# Patient Record
Sex: MALE | Race: WHITE | URBAN - METROPOLITAN AREA
[De-identification: names, ages, dates, MRNs, and addresses within clinical notes are randomized per-mention and may not be internally consistent; named-entity substitution may affect disease eponyms.]

---

## 2024-01-23 ENCOUNTER — OFFICE VISIT (OUTPATIENT)
Dept: INTERNAL MEDICINE CLINIC | Age: 64
End: 2024-01-23

## 2024-01-23 VITALS
SYSTOLIC BLOOD PRESSURE: 148 MMHG | DIASTOLIC BLOOD PRESSURE: 84 MMHG | BODY MASS INDEX: 26.1 KG/M2 | WEIGHT: 186.4 LBS | HEART RATE: 95 BPM | TEMPERATURE: 98.4 F | HEIGHT: 71 IN | OXYGEN SATURATION: 98 %

## 2024-01-23 DIAGNOSIS — R94.31 ABNORMAL EKG: ICD-10-CM

## 2024-01-23 DIAGNOSIS — Z00.00 ANNUAL PHYSICAL EXAM: ICD-10-CM

## 2024-01-23 DIAGNOSIS — Z76.89 ENCOUNTER TO ESTABLISH CARE WITH NEW DOCTOR: Primary | ICD-10-CM

## 2024-01-23 DIAGNOSIS — R03.0 ELEVATED BP WITHOUT DIAGNOSIS OF HYPERTENSION: ICD-10-CM

## 2024-01-23 DIAGNOSIS — Z11.59 NEED FOR HEPATITIS C SCREENING TEST: ICD-10-CM

## 2024-01-23 DIAGNOSIS — Z12.5 SCREENING PSA (PROSTATE SPECIFIC ANTIGEN): ICD-10-CM

## 2024-01-23 DIAGNOSIS — Z12.11 SCREEN FOR COLON CANCER: ICD-10-CM

## 2024-01-23 DIAGNOSIS — Z11.4 SCREENING FOR HIV (HUMAN IMMUNODEFICIENCY VIRUS): ICD-10-CM

## 2024-01-23 DIAGNOSIS — Z71.85 VACCINE COUNSELING: ICD-10-CM

## 2024-01-23 DIAGNOSIS — F17.200 SMOKER: ICD-10-CM

## 2024-01-23 DIAGNOSIS — R00.0 RACING HEART BEAT: ICD-10-CM

## 2024-01-23 DIAGNOSIS — Z13.220 SCREENING, LIPID: ICD-10-CM

## 2024-01-23 RX ORDER — BLOOD PRESSURE TEST KIT
KIT MISCELLANEOUS
Qty: 1 KIT | Refills: 0 | Status: SHIPPED | OUTPATIENT
Start: 2024-01-23

## 2024-01-23 SDOH — ECONOMIC STABILITY: HOUSING INSECURITY
IN THE LAST 12 MONTHS, WAS THERE A TIME WHEN YOU DID NOT HAVE A STEADY PLACE TO SLEEP OR SLEPT IN A SHELTER (INCLUDING NOW)?: NO

## 2024-01-23 SDOH — ECONOMIC STABILITY: INCOME INSECURITY: HOW HARD IS IT FOR YOU TO PAY FOR THE VERY BASICS LIKE FOOD, HOUSING, MEDICAL CARE, AND HEATING?: NOT HARD AT ALL

## 2024-01-23 SDOH — ECONOMIC STABILITY: FOOD INSECURITY: WITHIN THE PAST 12 MONTHS, YOU WORRIED THAT YOUR FOOD WOULD RUN OUT BEFORE YOU GOT MONEY TO BUY MORE.: NEVER TRUE

## 2024-01-23 SDOH — ECONOMIC STABILITY: FOOD INSECURITY: WITHIN THE PAST 12 MONTHS, THE FOOD YOU BOUGHT JUST DIDN'T LAST AND YOU DIDN'T HAVE MONEY TO GET MORE.: NEVER TRUE

## 2024-01-23 ASSESSMENT — ENCOUNTER SYMPTOMS
COUGH: 0
DIARRHEA: 0
VOMITING: 0
SHORTNESS OF BREATH: 0
CONSTIPATION: 0
WHEEZING: 0
NAUSEA: 0
ABDOMINAL PAIN: 0

## 2024-01-23 ASSESSMENT — PATIENT HEALTH QUESTIONNAIRE - PHQ9
SUM OF ALL RESPONSES TO PHQ QUESTIONS 1-9: 0
2. FEELING DOWN, DEPRESSED OR HOPELESS: 0
SUM OF ALL RESPONSES TO PHQ9 QUESTIONS 1 & 2: 0
1. LITTLE INTEREST OR PLEASURE IN DOING THINGS: 0

## 2024-01-23 NOTE — PROGRESS NOTES
New Patient Office Visit   1/23/2024    Subjective:  Chief Complaint   Patient presents with    New Patient    Other     Chest feels odd- Only when lying down intermittently with rapid heart beat    Loss of Consciousness     Approx 1 mo ago concerns with bp      HPI:  Khai Palacios is a 64 y.o. male who presents to the clinic today to establish care. Also due for an annual physical. States \"I have never went to the doctor.\"  Pt also has acute concerns.    States he has intermittent chest \"weird feeling\" and heart beating fast 24-25 years ago. Denies chest pain. States it \"just doesn't feel right.\" States this is still occurring intermittently at night. Has not had symptoms in 1 month. Denies chest pain, palpitations, shortness of breath, trouble breathing, lightheadedness, dizziness or blurred vision. Asymptomatic at this time.  Does not monitor BP at home.    Also has episodes of \"blacking out\" after smoking marijuana. States that \"I just woke up and was fine.\" States this lasts a minute and resolves spontaneously. Has occurred \"4 times in the past 8-10 years.\" Last episode was a month ago.    Smoker- 2 ppd. Smoking for 48 years. Not ready to quit.    Last colonoscopy: never    Worked construction. Retired. 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 Lives in [place].     Review of Systems   Constitutional:  Negative for chills, fatigue and fever.   Respiratory:  Negative for cough, shortness of breath and wheezing.    Cardiovascular:  Negative for chest pain, palpitations and leg swelling.        Heart racing at night intermittently for 24-25 years   Gastrointestinal:  Negative for abdominal pain, constipation, diarrhea, nausea and vomiting.   Skin:  Negative for pallor and rash.   Neurological:  Negative for dizziness, weakness, light-headedness, numbness and headaches.   Psychiatric/Behavioral:  Negative for dysphoric mood, self-injury, sleep disturbance and suicidal ideas. The patient is not nervous/anxious.      No Known

## 2024-01-23 NOTE — PATIENT INSTRUCTIONS
Please get your fasting lab work (no food or drink for 10-12 hours prior besides water) completed M-F 730a-430p at our office. [facility] lab has walk-in hours available as well - no appointment is needed. We will call or mychart message you with your results.     Take BP and HR daily and keep a log. Bring BP/HR log to the next visit.    Complete stool sample and mail back.    Call [address] [place] [address] to schedule echo.

## 2024-01-24 DIAGNOSIS — Z00.00 ANNUAL PHYSICAL EXAM: ICD-10-CM

## 2024-01-24 DIAGNOSIS — Z11.4 SCREENING FOR HIV (HUMAN IMMUNODEFICIENCY VIRUS): ICD-10-CM

## 2024-01-24 DIAGNOSIS — Z12.5 SCREENING PSA (PROSTATE SPECIFIC ANTIGEN): ICD-10-CM

## 2024-01-24 DIAGNOSIS — Z13.220 SCREENING, LIPID: ICD-10-CM

## 2024-01-24 DIAGNOSIS — Z11.59 NEED FOR HEPATITIS C SCREENING TEST: ICD-10-CM

## 2024-01-24 DIAGNOSIS — R00.0 RACING HEART BEAT: ICD-10-CM

## 2024-01-24 LAB
ALBUMIN SERPL-MCNC: 4.3 G/DL (ref 3.4–5)
ALBUMIN/GLOB SERPL: 1.7 {RATIO} (ref 1.1–2.2)
ALP SERPL-CCNC: 85 U/L (ref 40–129)
ALT SERPL-CCNC: 14 U/L (ref 10–40)
ANION GAP SERPL CALCULATED.3IONS-SCNC: 12 MMOL/L (ref 3–16)
AST SERPL-CCNC: 14 U/L (ref 15–37)
BASOPHILS # BLD: 0.1 K/UL (ref 0–0.2)
BASOPHILS NFR BLD: 0.9 %
BILIRUB SERPL-MCNC: 0.5 MG/DL (ref 0–1)
BUN SERPL-MCNC: 9 MG/DL (ref 7–20)
CALCIUM SERPL-MCNC: 9 MG/DL (ref 8.3–10.6)
CHLORIDE SERPL-SCNC: 103 MMOL/L (ref 99–110)
CHOLEST SERPL-MCNC: 215 MG/DL (ref 0–199)
CO2 SERPL-SCNC: 26 MMOL/L (ref 21–32)
CREAT SERPL-MCNC: 0.9 MG/DL (ref 0.8–1.3)
DEPRECATED RDW RBC AUTO: 13.3 % (ref 12.4–15.4)
EOSINOPHIL # BLD: 0.4 K/UL (ref 0–0.6)
EOSINOPHIL NFR BLD: 3.7 %
GFR SERPLBLD CREATININE-BSD FMLA CKD-EPI: >60 ML/MIN/{1.73_M2}
GLUCOSE SERPL-MCNC: 83 MG/DL (ref 70–99)
HCT VFR BLD AUTO: 43.7 % (ref 40.5–52.5)
HCV AB SERPL QL IA: NORMAL
HDLC SERPL-MCNC: 44 MG/DL (ref 40–60)
HGB BLD-MCNC: 15.1 G/DL (ref 13.5–17.5)
LDL CHOLESTEROL CALCULATED: 151 MG/DL
LYMPHOCYTES # BLD: 3.6 K/UL (ref 1–5.1)
LYMPHOCYTES NFR BLD: 30.7 %
MCH RBC QN AUTO: 30.8 PG (ref 26–34)
MCHC RBC AUTO-ENTMCNC: 34.5 G/DL (ref 31–36)
MCV RBC AUTO: 89.2 FL (ref 80–100)
MONOCYTES # BLD: 0.6 K/UL (ref 0–1.3)
MONOCYTES NFR BLD: 4.7 %
NEUTROPHILS # BLD: 7.1 K/UL (ref 1.7–7.7)
NEUTROPHILS NFR BLD: 60 %
PLATELET # BLD AUTO: 248 K/UL (ref 135–450)
PMV BLD AUTO: 7.7 FL (ref 5–10.5)
POTASSIUM SERPL-SCNC: 4.3 MMOL/L (ref 3.5–5.1)
PROT SERPL-MCNC: 6.9 G/DL (ref 6.4–8.2)
PSA SERPL DL<=0.01 NG/ML-MCNC: 1.48 NG/ML (ref 0–4)
RBC # BLD AUTO: 4.9 M/UL (ref 4.2–5.9)
SODIUM SERPL-SCNC: 141 MMOL/L (ref 136–145)
TRIGL SERPL-MCNC: 102 MG/DL (ref 0–150)
TSH SERPL DL<=0.005 MIU/L-ACNC: 2.87 UIU/ML (ref 0.27–4.2)
VLDLC SERPL CALC-MCNC: 20 MG/DL
WBC # BLD AUTO: 11.9 K/UL (ref 4–11)

## 2024-01-25 LAB
HIV 1+2 AB+HIV1 P24 AG SERPL QL IA: NORMAL
HIV 2 AB SERPL QL IA: NORMAL
HIV1 AB SERPL QL IA: NORMAL
HIV1 P24 AG SERPL QL IA: NORMAL

## 2024-01-26 ENCOUNTER — TELEPHONE (OUTPATIENT)
Dept: INTERNAL MEDICINE CLINIC | Age: 64
End: 2024-01-26

## 2024-01-26 DIAGNOSIS — Z79.899 MEDICATION MANAGEMENT: ICD-10-CM

## 2024-01-26 DIAGNOSIS — Z91.89 CANDIDATE FOR STATIN THERAPY DUE TO RISK OF FUTURE CARDIOVASCULAR EVENT: Primary | ICD-10-CM

## 2024-01-26 DIAGNOSIS — D72.828 OTHER ELEVATED WHITE BLOOD CELL COUNT: Primary | ICD-10-CM

## 2024-01-26 RX ORDER — ATORVASTATIN CALCIUM 40 MG/1
40 TABLET, FILM COATED ORAL DAILY
Qty: 90 TABLET | Refills: 0 | Status: SHIPPED | OUTPATIENT
Start: 2024-01-26

## 2024-01-26 NOTE — PROGRESS NOTES
The 10-year ASCVD risk score (Marisol SUGGS, et al., 2019) is: 22.7%    Values used to calculate the score:      Age: 64 years      Sex: Male      Is Non- : No      Diabetic: No      Tobacco smoker: Yes      Systolic Blood Pressure: 148 mmHg      Is BP treated: No      HDL Cholesterol: 44 mg/dL      Total Cholesterol: 215 mg/dL

## 2024-01-26 NOTE — TELEPHONE ENCOUNTER
----- Message from Margaret Rivera sent at 1/26/2024 10:53 AM EST -----  Subject: Message to Provider    QUESTIONS  Information for Provider? pt. is returning office phone call office did   not answer phone when pt. called back please , call pt. back   ---------------------------------------------------------------------------  --------------  CALL BACK INFO  [phone]; OK to leave message on voicemail  ---------------------------------------------------------------------------  --------------  SCRIPT ANSWERS  undefined

## 2024-02-13 ENCOUNTER — OFFICE VISIT (OUTPATIENT)
Dept: INTERNAL MEDICINE CLINIC | Age: 64
End: 2024-02-13
Payer: COMMERCIAL

## 2024-02-13 VITALS
DIASTOLIC BLOOD PRESSURE: 80 MMHG | HEART RATE: 80 BPM | HEIGHT: 71 IN | BODY MASS INDEX: 25.93 KG/M2 | OXYGEN SATURATION: 95 % | WEIGHT: 185.2 LBS | SYSTOLIC BLOOD PRESSURE: 142 MMHG

## 2024-02-13 DIAGNOSIS — E78.2 MIXED HYPERLIPIDEMIA: ICD-10-CM

## 2024-02-13 DIAGNOSIS — R03.0 ELEVATED BP WITHOUT DIAGNOSIS OF HYPERTENSION: Primary | ICD-10-CM

## 2024-02-13 DIAGNOSIS — D72.828 OTHER ELEVATED WHITE BLOOD CELL (WBC) COUNT: ICD-10-CM

## 2024-02-13 DIAGNOSIS — Z87.891 PERSONAL HISTORY OF TOBACCO USE: ICD-10-CM

## 2024-02-13 DIAGNOSIS — I10 PRIMARY HYPERTENSION: ICD-10-CM

## 2024-02-13 DIAGNOSIS — R00.0 RACING HEART BEAT: ICD-10-CM

## 2024-02-13 DIAGNOSIS — F17.200 SMOKER: ICD-10-CM

## 2024-02-13 DIAGNOSIS — Z12.11 SCREEN FOR COLON CANCER: ICD-10-CM

## 2024-02-13 PROCEDURE — G0296 VISIT TO DETERM LDCT ELIG: HCPCS | Performed by: NURSE PRACTITIONER

## 2024-02-13 PROCEDURE — 99215 OFFICE O/P EST HI 40 MIN: CPT | Performed by: NURSE PRACTITIONER

## 2024-02-13 PROCEDURE — 3079F DIAST BP 80-89 MM HG: CPT | Performed by: NURSE PRACTITIONER

## 2024-02-13 PROCEDURE — 3077F SYST BP >= 140 MM HG: CPT | Performed by: NURSE PRACTITIONER

## 2024-02-13 RX ORDER — LOSARTAN POTASSIUM 50 MG/1
50 TABLET ORAL DAILY
Qty: 30 TABLET | Refills: 1 | Status: SHIPPED | OUTPATIENT
Start: 2024-02-13

## 2024-02-13 NOTE — PATIENT INSTRUCTIONS
Call [address] [place] [address] to schedule holter monitor and lung cancer screening CT scan    Repeat fasting labs 6 weeks from when you started lipitor.  Please get your fasting lab work (no food or drink for 10-12 hours prior besides water) completed M-F 730a-430p at our office. [facility] lab has walk-in hours available as well - no appointment is needed. We will call or mychart message you with your results.     Start losartan daily.       Learning About Lung Cancer Screening  What is screening for lung cancer?     Lung cancer screening is a way to find some lung cancers early, before a person has any symptoms of the cancer.  Lung cancer screening may help those who have the highest risk for lung cancer--people age 50 and older who are or were heavy smokers. For most people, who aren't at increased risk, screening for lung cancer probably isn't helpful.  Screening won't prevent cancer. And it may not find all lung cancers. Lung cancer screening may lower the risk of dying from lung cancer in a small number of people.  How is it done?  Lung cancer screening is done with a low-dose CT (computed tomography) scan. A CT scan uses X-rays, or radiation, to make detailed pictures of your body. Experts recommend that screening be done in medical centers that focus on finding and treating lung cancer.  Who is screening recommended for?  Lung cancer screening is recommended for people age 50 and older who are or were heavy smokers. That means people with a smoking history of at least 20 pack years. A pack year is a way to measure how heavy a smoker you are or were.  To figure out your pack years, multiply how many packs a day on average (assuming 20 cigarettes per pack) you have smoked by how many years you have smoked. For example:  If you smoked 1 pack a day for 20 years, that's 1 times 20. So you have a smoking history of 20 pack years.  If you smoked 2 packs a day for 10 years, that's 2 times 10. So you have a smoking

## 2024-02-13 NOTE — PROGRESS NOTES
Office Visit  2/13/2024    Subjective:  Chief Complaint   Patient presents with    Follow-up    Hypertension     HPI:   Khai Palacios is a 64 y.o. male who presents to the clinic today for follow up.    Elevated blood pressure without the diagnosis hypertension -  Monitoring home BP log - thinks his home BP cuff is wrong. Home BP ranges from 111//150 on log. Did not bring BP cuff to compare today.  Racing heartbeat when he lies down for the last 20+ years. States that this has not occurred since our last visit.  has echo scheduled.    Hyperlipidemia- taking statin as prescribed- denies side effects.  Has repeat labs for 6 weeks after starting the medication.    Review of Systems   Constitutional:  Negative for chills, fatigue and fever.   Respiratory:  Negative for cough, shortness of breath and wheezing.    Cardiovascular:  Negative for chest pain, palpitations and leg swelling.   Gastrointestinal:  Negative for abdominal pain, constipation, diarrhea, nausea and vomiting.   Skin:  Negative for pallor and rash.   Neurological:  Negative for dizziness, weakness, light-headedness, numbness and headaches.   Psychiatric/Behavioral:  Negative for dysphoric mood, self-injury, sleep disturbance and suicidal ideas. The patient is not nervous/anxious.      No Known Allergies    Current Outpatient Rx   Medication Sig Dispense Refill    losartan (COZAAR) 50 MG tablet Take 1 tablet by mouth daily 30 tablet 1    atorvastatin (LIPITOR) 40 MG tablet Take 1 tablet by mouth daily 90 tablet 0    Blood Pressure KIT Take BP daily and keep a log. 1 kit 0    ibuprofen (ADVIL;MOTRIN) 200 MG tablet Take 4 tablets by mouth every 6 hours as needed       Patient Active Problem List   Diagnosis    Mixed hyperlipidemia     Wt Readings from Last 3 Encounters:   02/13/24 84 kg (185 lb 3.2 oz)   01/23/24 84.6 kg (186 lb 6.4 oz)   09/24/12 77.1 kg (170 lb)     BP Readings from Last 3 Encounters:   02/13/24 (!) 142/80   01/23/24 (!) 148/84

## 2024-02-17 LAB — NONINV COLON CA DNA+OCC BLD SCRN STL QL: POSITIVE

## 2024-02-20 ENCOUNTER — HOSPITAL ENCOUNTER (OUTPATIENT)
Dept: NEUROLOGY | Age: 64
Discharge: HOME | End: 2024-02-20
Payer: COMMERCIAL

## 2024-02-20 DIAGNOSIS — R00.0 RACING HEART BEAT: ICD-10-CM

## 2024-02-20 DIAGNOSIS — R19.5 POSITIVE COLORECTAL CANCER SCREENING USING COLOGUARD TEST: Primary | ICD-10-CM

## 2024-02-20 PROCEDURE — 93226 XTRNL ECG REC<48 HR SCAN A/R: CPT

## 2024-02-20 PROCEDURE — 93225 XTRNL ECG REC<48 HRS REC: CPT

## 2024-02-27 ENCOUNTER — HOSPITAL ENCOUNTER (OUTPATIENT)
Dept: NON INVASIVE DIAGNOSTICS | Age: 64
Discharge: HOME | End: 2024-02-27
Payer: COMMERCIAL

## 2024-02-27 LAB
ACQUISITION DURATION: NORMAL S
AVERAGE HEART RATE: 94 BPM
EKG DIAGNOSIS: NORMAL
HOLTER MAX HEART RATE: 124 BPM
HOOKUP DATE: NORMAL
HOOKUP TIME: NORMAL
MAX HEART RATE TIME/DATE: NORMAL
MIN HEART RATE TIME/DATE: NORMAL
MIN HEART RATE: 72 BPM
NUMBER OF QRS COMPLEXES: NORMAL
NUMBER OF SUPRAVENTRICULAR COUPLETS: 0
NUMBER OF SUPRAVENTRICULAR ECTOPICS: 214
NUMBER OF SUPRAVENTRICULAR ISOLATED BEATS: 212
NUMBER OF VENTRICULAR BIGEMINAL CYCLES: 0
NUMBER OF VENTRICULAR COUPLETS: 0
NUMBER OF VENTRICULAR ECTOPICS: 150

## 2024-02-27 PROCEDURE — 93306 TTE W/DOPPLER COMPLETE: CPT

## 2024-02-28 ENCOUNTER — TELEPHONE (OUTPATIENT)
Dept: INTERNAL MEDICINE CLINIC | Age: 64
End: 2024-02-28

## 2024-02-29 ENCOUNTER — TELEPHONE (OUTPATIENT)
Dept: INTERNAL MEDICINE CLINIC | Age: 64
End: 2024-02-29

## 2024-02-29 NOTE — TELEPHONE ENCOUNTER
Pt states he missed a call for results yesterday, he is returning the call. Please advise and call pt. Aware Raquel is out of the office today.

## 2024-03-01 ENCOUNTER — TELEPHONE (OUTPATIENT)
Dept: INTERNAL MEDICINE CLINIC | Age: 64
End: 2024-03-01

## 2024-03-01 DIAGNOSIS — I10 PRIMARY HYPERTENSION: ICD-10-CM

## 2024-03-01 DIAGNOSIS — R00.0 RACING HEART BEAT: Primary | ICD-10-CM

## 2024-03-04 ENCOUNTER — HOSPITAL ENCOUNTER (OUTPATIENT)
Dept: CT IMAGING | Age: 64
Discharge: HOME | End: 2024-03-04
Payer: COMMERCIAL

## 2024-03-04 DIAGNOSIS — Z87.891 PERSONAL HISTORY OF TOBACCO USE: ICD-10-CM

## 2024-03-04 PROCEDURE — 71271 CT THORAX LUNG CANCER SCR C-: CPT

## 2024-03-05 ENCOUNTER — TELEPHONE (OUTPATIENT)
Dept: CARDIOLOGY CLINIC | Age: 64
End: 2024-03-05

## 2024-03-05 NOTE — TELEPHONE ENCOUNTER
Pt's partner is asking for a call to discuss echo & holter monitor results and asking if pt should make appt with our office. Wife states pcp suggested to contact us do to results. Pt had colonoscopy scheduled and was canceled. Please call pt to advise.

## 2024-03-05 NOTE — PROGRESS NOTES
[facility]  Cardiology Note  [phone]      Chief Complaint   Patient presents with    Hyperlipidemia     No cardiac symptoms at this time.    Palpitations    New Patient      History of Present Illness:  Khai Palacios is a 64 y.o. patient PMHx HTN, HLD, palpitations who presented to the office at the request of his PCP JANIS Oneill for cardiac evaluation of chest pain and palpitations.  I have been asked to provide consultation regarding further management and testing. He is retired, used to race motorcycles. He smokes cigarettes >1ppd.    Recent ECHO stable with normal EF. Recent HM normal with episodes of ST.    Today, he reports he has had episodes of weird feeling in his chest, not a true pain, his pulse felt like it was \"skipping\" beats which concerned him. He woke up suddenly once with hands tingling which scared him.  Sometimes he has chest pain when walking his dog, but not every time. He rode his bike the other day with no symptoms. He also reports times of heart racing slowly comes on, dissipates in about 15 minutes; these are not new has had intermittently for many years.  He states symptoms slowly improving since starting B/P med 2/13/24. He quit smoking in the past but then gained weight. He is asking if he is ok to proceed with a colonoscopy.    Past Medical History:   has a past medical history of Neck pain.    Surgical History:   has no past surgical history on file.     Social History:   reports that he has been smoking cigarettes. He started smoking about 48 years ago. He has a 96.2 pack-year smoking history. He does not have any smokeless tobacco history on file. He reports current alcohol use. He reports current drug use. Drug: Marijuana (Weed).     Family History:  Family History   Problem Relation Age of Onset    Dementia Mother     Heart Disease Father     Heart Attack Father     Stroke Neg Hx     Prostate Cancer Neg Hx        Home Medications:  Were reviewed and are listed

## 2024-03-06 ENCOUNTER — OFFICE VISIT (OUTPATIENT)
Dept: CARDIOLOGY CLINIC | Age: 64
End: 2024-03-06
Payer: COMMERCIAL

## 2024-03-06 ENCOUNTER — TELEPHONE (OUTPATIENT)
Dept: INTERNAL MEDICINE CLINIC | Age: 64
End: 2024-03-06

## 2024-03-06 VITALS
HEIGHT: 71 IN | SYSTOLIC BLOOD PRESSURE: 150 MMHG | DIASTOLIC BLOOD PRESSURE: 82 MMHG | OXYGEN SATURATION: 98 % | WEIGHT: 184 LBS | HEART RATE: 91 BPM | BODY MASS INDEX: 25.76 KG/M2

## 2024-03-06 DIAGNOSIS — E78.2 MIXED HYPERLIPIDEMIA: ICD-10-CM

## 2024-03-06 DIAGNOSIS — R07.9 CHEST PAIN, UNSPECIFIED TYPE: Primary | ICD-10-CM

## 2024-03-06 DIAGNOSIS — R00.2 PALPITATIONS: ICD-10-CM

## 2024-03-06 DIAGNOSIS — R91.8 ABNORMAL CT LUNG SCREENING: Primary | ICD-10-CM

## 2024-03-06 PROCEDURE — 99204 OFFICE O/P NEW MOD 45 MIN: CPT | Performed by: INTERNAL MEDICINE

## 2024-03-06 RX ORDER — METOPROLOL TARTRATE 50 MG/1
TABLET, FILM COATED ORAL
Qty: 3 TABLET | Refills: 0 | Status: SHIPPED | OUTPATIENT
Start: 2024-03-06

## 2024-03-06 NOTE — PATIENT INSTRUCTIONS
Strongly encouraged to quit smoking -- find a replacement activity.    Recommend regular exercise.    Take metoprolol 150mg (3 tablets) two hours before your cat scan.

## 2024-03-06 NOTE — TELEPHONE ENCOUNTER
I called and reviewed the results of the CT lung screening with the patient.  Patient reports that he is completely asymptomatic.  He denies cough, shortness of breath, fever or chills.  Patient agreeable to repeat CT lung screening in 3 months.  Order was placed.  He will call to schedule.  Patient agreeable to call if he develops any symptoms    Electronically signed by: JANIS Disla CNP 03/06/24

## 2024-03-12 ENCOUNTER — OFFICE VISIT (OUTPATIENT)
Dept: INTERNAL MEDICINE CLINIC | Age: 64
End: 2024-03-12
Payer: COMMERCIAL

## 2024-03-12 VITALS
BODY MASS INDEX: 26.12 KG/M2 | DIASTOLIC BLOOD PRESSURE: 70 MMHG | OXYGEN SATURATION: 98 % | SYSTOLIC BLOOD PRESSURE: 130 MMHG | HEART RATE: 80 BPM | WEIGHT: 186.6 LBS | HEIGHT: 71 IN

## 2024-03-12 DIAGNOSIS — R19.5 POSITIVE COLORECTAL CANCER SCREENING USING COLOGUARD TEST: ICD-10-CM

## 2024-03-12 DIAGNOSIS — D72.828 OTHER ELEVATED WHITE BLOOD CELL (WBC) COUNT: ICD-10-CM

## 2024-03-12 DIAGNOSIS — E78.2 MIXED HYPERLIPIDEMIA: ICD-10-CM

## 2024-03-12 DIAGNOSIS — R00.0 RACING HEART BEAT: ICD-10-CM

## 2024-03-12 DIAGNOSIS — I10 PRIMARY HYPERTENSION: Primary | ICD-10-CM

## 2024-03-12 DIAGNOSIS — F17.200 SMOKER: ICD-10-CM

## 2024-03-12 DIAGNOSIS — Z91.89 CANDIDATE FOR STATIN THERAPY DUE TO RISK OF FUTURE CARDIOVASCULAR EVENT: ICD-10-CM

## 2024-03-12 PROCEDURE — 3075F SYST BP GE 130 - 139MM HG: CPT | Performed by: NURSE PRACTITIONER

## 2024-03-12 PROCEDURE — 3078F DIAST BP <80 MM HG: CPT | Performed by: NURSE PRACTITIONER

## 2024-03-12 PROCEDURE — 99214 OFFICE O/P EST MOD 30 MIN: CPT | Performed by: NURSE PRACTITIONER

## 2024-03-12 RX ORDER — ATORVASTATIN CALCIUM 40 MG/1
40 TABLET, FILM COATED ORAL DAILY
Qty: 90 TABLET | Refills: 0 | Status: SHIPPED | OUTPATIENT
Start: 2024-03-12

## 2024-03-12 RX ORDER — LOSARTAN POTASSIUM 50 MG/1
50 TABLET ORAL DAILY
Qty: 90 TABLET | Refills: 0 | Status: SHIPPED | OUTPATIENT
Start: 2024-03-12

## 2024-03-12 ASSESSMENT — ENCOUNTER SYMPTOMS
NAUSEA: 0
VOMITING: 0
SHORTNESS OF BREATH: 0
WHEEZING: 0
DIARRHEA: 0
ABDOMINAL PAIN: 0
COUGH: 0
CONSTIPATION: 0

## 2024-03-12 NOTE — PROGRESS NOTES
Office Visit   3/12/2024    Subjective:  Chief Complaint   Patient presents with    Follow-up    Hypertension     HPI:   Khai Palacios is a 64 y.o. male who presents to the clinic today for follow up.    hypertension - Monitoring home BP log -patient is taking losartan 50 mg by mouth daily without side effects. home BP ranges from 123-169/ on log- reviewed home BP log. However, in the last week, his BP has been running to goal. States he feels better on this medications. Palpitations have resolved on this medication per pt.   Seeing cardiology.  Has cardiac scan scheduled.     Hyperlipidemia- taking liptor 40 mg daily as prescribed- denies side effects.  Has repeat labs ordered and plans to completed these this week.    Smoker- was smoking 2 ppd. Smoking for 48 years. Quit smoking cold turkey on Saturday.    Review of Systems   Constitutional:  Negative for chills, fatigue and fever.   Respiratory:  Negative for cough, shortness of breath and wheezing.    Cardiovascular:  Negative for chest pain.   Gastrointestinal:  Negative for abdominal pain, constipation, diarrhea, nausea and vomiting.   Skin:  Negative for pallor and rash.   Neurological:  Negative for dizziness, weakness, light-headedness, numbness and headaches.   Psychiatric/Behavioral:  Negative for dysphoric mood, self-injury, sleep disturbance and suicidal ideas. The patient is not nervous/anxious.      No Known Allergies    Current Outpatient Rx   Medication Sig Dispense Refill    losartan (COZAAR) 50 MG tablet Take 1 tablet by mouth daily 90 tablet 0    atorvastatin (LIPITOR) 40 MG tablet Take 1 tablet by mouth daily 90 tablet 0    Blood Pressure KIT Take BP daily and keep a log. 1 kit 0    ibuprofen (ADVIL;MOTRIN) 200 MG tablet Take 4 tablets by mouth every 6 hours as needed      metoprolol tartrate (LOPRESSOR) 50 MG tablet Take 150mg (3 tablets) two hours before cat scan. (Patient not taking: Reported on 3/12/2024) 3 tablet 0     Patient

## 2024-03-12 NOTE — PATIENT INSTRUCTIONS
Please get your fasting lab work (no food or drink for 10-12 hours prior besides water) completed M-F 730a-430p at our office. [facility] lab has walk-in hours available as well - no appointment is needed. We will call or [employer] message you with your results.

## 2024-03-20 ENCOUNTER — PRE-PROCEDURE TELEPHONE (OUTPATIENT)
Dept: INTERVENTIONAL RADIOLOGY/VASCULAR | Age: 64
End: 2024-03-20

## 2024-03-27 ENCOUNTER — TELEPHONE (OUTPATIENT)
Age: 64
End: 2024-03-27

## 2024-03-27 ENCOUNTER — HOSPITAL ENCOUNTER (OUTPATIENT)
Dept: CT IMAGING | Age: 64
Discharge: HOME | End: 2024-03-27
Attending: INTERNAL MEDICINE
Payer: COMMERCIAL

## 2024-03-27 VITALS
WEIGHT: 175 LBS | HEART RATE: 59 BPM | SYSTOLIC BLOOD PRESSURE: 107 MMHG | OXYGEN SATURATION: 97 % | BODY MASS INDEX: 24.5 KG/M2 | HEIGHT: 71 IN | DIASTOLIC BLOOD PRESSURE: 67 MMHG | RESPIRATION RATE: 16 BRPM

## 2024-03-27 DIAGNOSIS — R93.1 ABNORMAL COMPUTED TOMOGRAPHY ANGIOGRAPHY OF HEART: ICD-10-CM

## 2024-03-27 DIAGNOSIS — E78.2 MIXED HYPERLIPIDEMIA: ICD-10-CM

## 2024-03-27 DIAGNOSIS — R07.9 CHEST PAIN, UNSPECIFIED TYPE: ICD-10-CM

## 2024-03-27 DIAGNOSIS — R00.2 PALPITATIONS: ICD-10-CM

## 2024-03-27 LAB
CREAT SERPL-MCNC: 1.1 MG/DL (ref 0.8–1.3)
GFR SERPLBLD CREATININE-BSD FMLA CKD-EPI: 75 ML/MIN/{1.73_M2}

## 2024-03-27 PROCEDURE — 82565 ASSAY OF CREATININE: CPT

## 2024-03-27 PROCEDURE — 6360000004 HC RX CONTRAST MEDICATION: Performed by: INTERNAL MEDICINE

## 2024-03-27 PROCEDURE — 75574 CT ANGIO HRT W/3D IMAGE: CPT

## 2024-03-27 PROCEDURE — 36415 COLL VENOUS BLD VENIPUNCTURE: CPT

## 2024-03-27 RX ORDER — NITROGLYCERIN 0.4 MG/1
0.4 TABLET SUBLINGUAL ONCE
Status: DISCONTINUED | OUTPATIENT
Start: 2024-03-27 | End: 2024-03-28 | Stop reason: HOSPADM

## 2024-03-27 RX ADMIN — IOPAMIDOL 85 ML: 755 INJECTION, SOLUTION INTRAVENOUS at 09:27

## 2024-03-27 NOTE — FLOWSHEET NOTE
Pt tolerated procedure well. VSS. IV removed without difficulty. Pt given d/c instructions and stated understanding. Released in stable condition to home. /75

## 2024-03-27 NOTE — TELEPHONE ENCOUNTER
Re-attempt to discuss Cardiac CTA results. Straight to voicemail. Message left previously awaiting call back.

## 2024-03-27 NOTE — TELEPHONE ENCOUNTER
----- Message from Aaron Woodward MD sent at 3/27/2024 12:57 PM EDT -----  Please schedule patient for cath for possible blockages seen on cardiac CT

## 2024-03-28 ENCOUNTER — TELEPHONE (OUTPATIENT)
Dept: CARDIOLOGY CLINIC | Age: 64
End: 2024-03-28

## 2024-03-28 NOTE — TELEPHONE ENCOUNTER
Attempted to reach patient in regards to abnormal CTA and need for LHC. Phone goes straight to . LM to return our call.

## 2024-03-28 NOTE — TELEPHONE ENCOUNTER
Spoke to pt and advised per ADM message. Message sent to SW to call pt and schedule. Pt has no further questions at this time.

## 2024-03-28 NOTE — TELEPHONE ENCOUNTER
----- Message from Savita Baum RN sent at 3/27/2024  4:28 PM EDT -----  Regarding: Cardiac CTA results  Attempted patient twice today to discuss cardiac CTA results. Per ADM \"Please schedule patient for cath for possible blockages seen on cardiac CT\". I placed the order for the [facility] with first available, if you have time to re-attempt patient tomorrow I'd appreciate it.

## 2024-03-28 NOTE — TELEPHONE ENCOUNTER
----- Message from Savita Baum RN sent at 3/27/2024  4:28 PM EDT -----  Regarding: Cardiac CTA results  Attempted patient twice today to discuss cardiac CTA results. Per ADM \"Please schedule patient for cath for possible blockages seen on cardiac CT\". I placed the order for the [facility] with first available, if you have time to re-attempt patient tomorrow I'd appreciate it.

## 2024-03-29 ENCOUNTER — TELEPHONE (OUTPATIENT)
Dept: CARDIOLOGY CLINIC | Age: 64
End: 2024-03-29

## 2024-03-29 NOTE — TELEPHONE ENCOUNTER
LHC noted. Thank you Cary!    OV rescheduled to 4/12 s/p LHC. Encouraged to call with further questions. Pt verbalized understanding.

## 2024-03-29 NOTE — TELEPHONE ENCOUNTER
Date of Procedure: Thursday 4/11/24 @ [facility] with Dr. Avila     Time of arrival: 6:45 am     Procedure time: 8:00 am     Called and spoke to Khai and he is agreeable to date and time. Reviewed instructions and he verbalized understanding. Encouraged to call with any questions or concerns.     Published on [employer], scheduled in Epic and e-mailed to cath lab.

## 2024-03-29 NOTE — TELEPHONE ENCOUNTER
----- Message from Rona Garcia RN sent at 3/28/2024 11:15 AM EDT -----  Regarding: [facility]      Jaime Townsend,     I wasn't sure if Savita had sent you a message yesterday or not, but I followed up on a call for her to let the pt know that he needs a [facility]. She placed the order. I spoke to the pt and advised. Just let Savita know if you need anything.     RENZO Lay

## 2024-04-11 ENCOUNTER — HOSPITAL ENCOUNTER (OUTPATIENT)
Dept: CARDIAC CATH/INVASIVE PROCEDURES | Age: 64
Discharge: HOME | End: 2024-04-11
Attending: INTERNAL MEDICINE | Admitting: INTERNAL MEDICINE
Payer: COMMERCIAL

## 2024-04-11 VITALS
SYSTOLIC BLOOD PRESSURE: 141 MMHG | WEIGHT: 175 LBS | HEIGHT: 71 IN | DIASTOLIC BLOOD PRESSURE: 82 MMHG | BODY MASS INDEX: 24.5 KG/M2 | RESPIRATION RATE: 16 BRPM | OXYGEN SATURATION: 100 % | HEART RATE: 69 BPM | TEMPERATURE: 98.4 F

## 2024-04-11 DIAGNOSIS — I25.83 CORONARY ARTERY DISEASE DUE TO LIPID RICH PLAQUE: Primary | ICD-10-CM

## 2024-04-11 DIAGNOSIS — E78.2 MIXED HYPERLIPIDEMIA: ICD-10-CM

## 2024-04-11 DIAGNOSIS — I25.10 CORONARY ARTERY DISEASE DUE TO LIPID RICH PLAQUE: Primary | ICD-10-CM

## 2024-04-11 LAB
ANION GAP SERPL CALCULATED.3IONS-SCNC: 11 MMOL/L (ref 3–16)
BUN SERPL-MCNC: 11 MG/DL (ref 7–20)
CALCIUM SERPL-MCNC: 9.2 MG/DL (ref 8.3–10.6)
CHLORIDE SERPL-SCNC: 106 MMOL/L (ref 99–110)
CO2 SERPL-SCNC: 24 MMOL/L (ref 21–32)
CREAT SERPL-MCNC: 0.9 MG/DL (ref 0.8–1.3)
DEPRECATED RDW RBC AUTO: 13.6 % (ref 12.4–15.4)
EKG ATRIAL RATE: 53 BPM
EKG ATRIAL RATE: 81 BPM
EKG DIAGNOSIS: NORMAL
EKG DIAGNOSIS: NORMAL
EKG P AXIS: 72 DEGREES
EKG P AXIS: 72 DEGREES
EKG P-R INTERVAL: 158 MS
EKG P-R INTERVAL: 162 MS
EKG Q-T INTERVAL: 364 MS
EKG Q-T INTERVAL: 406 MS
EKG QRS DURATION: 88 MS
EKG QRS DURATION: 88 MS
EKG QTC CALCULATION (BAZETT): 380 MS
EKG QTC CALCULATION (BAZETT): 422 MS
EKG R AXIS: 53 DEGREES
EKG R AXIS: 66 DEGREES
EKG T AXIS: 52 DEGREES
EKG T AXIS: 54 DEGREES
EKG VENTRICULAR RATE: 53 BPM
EKG VENTRICULAR RATE: 81 BPM
GFR SERPLBLD CREATININE-BSD FMLA CKD-EPI: >90 ML/MIN/{1.73_M2}
GLUCOSE SERPL-MCNC: 104 MG/DL (ref 70–99)
HCT VFR BLD AUTO: 42.6 % (ref 40.5–52.5)
HGB BLD-MCNC: 14.3 G/DL (ref 13.5–17.5)
LEFT VENTRICULAR EJECTION FRACTION MODE: NORMAL
LV EF: 60 %
MCH RBC QN AUTO: 30.1 PG (ref 26–34)
MCHC RBC AUTO-ENTMCNC: 33.6 G/DL (ref 31–36)
MCV RBC AUTO: 89.7 FL (ref 80–100)
PLATELET # BLD AUTO: 232 K/UL (ref 135–450)
PMV BLD AUTO: 7.8 FL (ref 5–10.5)
POC ACT LR: 200 SEC
POC ACT LR: 231 SEC
POC ACT LR: 249 SEC
POTASSIUM SERPL-SCNC: 4 MMOL/L (ref 3.5–5.1)
RBC # BLD AUTO: 4.75 M/UL (ref 4.2–5.9)
SODIUM SERPL-SCNC: 141 MMOL/L (ref 136–145)
WBC # BLD AUTO: 10.1 K/UL (ref 4–11)

## 2024-04-11 PROCEDURE — C1887 CATHETER, GUIDING: HCPCS

## 2024-04-11 PROCEDURE — 92928 PRQ TCAT PLMT NTRAC ST 1 LES: CPT

## 2024-04-11 PROCEDURE — 93458 L HRT ARTERY/VENTRICLE ANGIO: CPT

## 2024-04-11 PROCEDURE — 93458 L HRT ARTERY/VENTRICLE ANGIO: CPT | Performed by: INTERNAL MEDICINE

## 2024-04-11 PROCEDURE — 85027 COMPLETE CBC AUTOMATED: CPT

## 2024-04-11 PROCEDURE — 99152 MOD SED SAME PHYS/QHP 5/>YRS: CPT

## 2024-04-11 PROCEDURE — 92928 PRQ TCAT PLMT NTRAC ST 1 LES: CPT | Performed by: INTERNAL MEDICINE

## 2024-04-11 PROCEDURE — C1769 GUIDE WIRE: HCPCS

## 2024-04-11 PROCEDURE — 93005 ELECTROCARDIOGRAM TRACING: CPT | Performed by: INTERNAL MEDICINE

## 2024-04-11 PROCEDURE — 93010 ELECTROCARDIOGRAM REPORT: CPT | Performed by: INTERNAL MEDICINE

## 2024-04-11 PROCEDURE — 93571 IV DOP VEL&/PRESS C FLO 1ST: CPT

## 2024-04-11 PROCEDURE — 80048 BASIC METABOLIC PNL TOTAL CA: CPT

## 2024-04-11 PROCEDURE — 2500000003 HC RX 250 WO HCPCS

## 2024-04-11 PROCEDURE — C1874 STENT, COATED/COV W/DEL SYS: HCPCS

## 2024-04-11 PROCEDURE — 6370000000 HC RX 637 (ALT 250 FOR IP): Performed by: INTERNAL MEDICINE

## 2024-04-11 PROCEDURE — 6360000004 HC RX CONTRAST MEDICATION: Performed by: INTERNAL MEDICINE

## 2024-04-11 PROCEDURE — 2709999900 HC NON-CHARGEABLE SUPPLY

## 2024-04-11 PROCEDURE — 2580000003 HC RX 258

## 2024-04-11 PROCEDURE — 99152 MOD SED SAME PHYS/QHP 5/>YRS: CPT | Performed by: INTERNAL MEDICINE

## 2024-04-11 PROCEDURE — 6370000000 HC RX 637 (ALT 250 FOR IP)

## 2024-04-11 PROCEDURE — C1894 INTRO/SHEATH, NON-LASER: HCPCS

## 2024-04-11 PROCEDURE — 36415 COLL VENOUS BLD VENIPUNCTURE: CPT

## 2024-04-11 PROCEDURE — 99153 MOD SED SAME PHYS/QHP EA: CPT

## 2024-04-11 PROCEDURE — 85347 COAGULATION TIME ACTIVATED: CPT

## 2024-04-11 PROCEDURE — C1725 CATH, TRANSLUMIN NON-LASER: HCPCS

## 2024-04-11 PROCEDURE — 93571 IV DOP VEL&/PRESS C FLO 1ST: CPT | Performed by: INTERNAL MEDICINE

## 2024-04-11 PROCEDURE — 6360000002 HC RX W HCPCS

## 2024-04-11 RX ORDER — PANTOPRAZOLE SODIUM 40 MG/1
40 TABLET, DELAYED RELEASE ORAL ONCE
Status: COMPLETED | OUTPATIENT
Start: 2024-04-11 | End: 2024-04-11

## 2024-04-11 RX ORDER — ASPIRIN 81 MG/1
81 TABLET ORAL DAILY
Qty: 90 TABLET | Refills: 0 | Status: SHIPPED | OUTPATIENT
Start: 2024-04-11

## 2024-04-11 RX ORDER — CLOPIDOGREL BISULFATE 75 MG/1
75 TABLET ORAL DAILY
Qty: 90 TABLET | Refills: 3 | Status: SHIPPED | OUTPATIENT
Start: 2024-04-11

## 2024-04-11 RX ORDER — SODIUM CHLORIDE 0.9 % (FLUSH) 0.9 %
5-40 SYRINGE (ML) INJECTION EVERY 12 HOURS SCHEDULED
Status: DISCONTINUED | OUTPATIENT
Start: 2024-04-11 | End: 2024-04-11 | Stop reason: HOSPADM

## 2024-04-11 RX ORDER — SODIUM CHLORIDE 0.9 % (FLUSH) 0.9 %
5-40 SYRINGE (ML) INJECTION PRN
Status: DISCONTINUED | OUTPATIENT
Start: 2024-04-11 | End: 2024-04-11 | Stop reason: HOSPADM

## 2024-04-11 RX ORDER — SODIUM CHLORIDE 9 MG/ML
INJECTION, SOLUTION INTRAVENOUS PRN
Status: DISCONTINUED | OUTPATIENT
Start: 2024-04-11 | End: 2024-04-11 | Stop reason: HOSPADM

## 2024-04-11 RX ADMIN — IOPAMIDOL 124 ML: 755 INJECTION, SOLUTION INTRAVENOUS at 09:56

## 2024-04-11 RX ADMIN — PANTOPRAZOLE SODIUM 40 MG: 40 TABLET, DELAYED RELEASE ORAL at 11:23

## 2024-04-11 NOTE — PROCEDURES
Patient:  Khai Palacios   :   1960    PROCEDURAL SUMMARY  ~Consent:   Obtained written and verbal consent      Risks/benefits explained in detail  ~Procedure:    Left Heart Catheterization  ~Medications:    Procedural sedation with minimal conscious sedation  ~Complications:   None  ~Blood Loss:    <10cc  ~Specimens:    None obtained  ~Pre-sedation re-evaluation: Performed immediately prior to procedure.  ~Performing Physician:          Juan A Avila MD    ~Assistants:       None    INDICATIONS:  Pre-Procedure Diagnosis:  New Onset Angina <= 2 months, Worsening Angina, and Suspected CAD    Post-Procedure Diagnosis: same    PROCEDURES PERFORMED:   Left heart catheterization with  PCI and FFR.    PROCEDURE DETAILS/TECHNIQUE:  Local anesthetic was given and access was obtained in the right Radial Artery using a micropuncture technique and a (5/6) Fr Slender Terumo Sheath was placed without difficulty. Catheters were advanced over a 0.35 wire under fluoroscopic guidance  Left coronary angiography was done using a 5 Fr Barbie L 3.5 diagnostic catheter.  Right coronary angiography was done using a 5 Fr Barbie R4 diagnostic catheter.  Left ventriculography was done using a 5 Fr pigtail catheter. At the end of the procedure a TR band device was used for hemostasis.     ANGIOGRAM/CORONARY ARTERIOGRAM:     Left main artery Bifurcates into the left anterior descending artery and left circumflex artery nml   Left anterior descending artery Gives rise to 2 diagonal arteries Mid 80%, distal 95%   Diagonals  nml   Left circumflex artery Dominant vessel that gives rise to 2 obtuse marginal arteries posterior descending artery and posterolateral branch nml   Obtuse Marginals  nml     Right coronary artery Non Dominant vessel nml   Posterior descending artery Posterior lateral branch  nml  nml       LEFT VENTRICULOGRAM:  Left ventricular angiogram was done in the 30° CASH projection and revealed  LVEF- 60%  LVG-  nml  LVEDP- 4  Aortic pressure- 83/51 There was no gradient between the left ventricle and aorta    FFR of LAD 0.75, comet wire    INTERVENTION:  Lesion 1, PCI of LAD:  ~Guide catheter: Accessing and selectively catheterizing with the xb 3.5  ~Coronary wire: Traversing the lesion with a elvi blue  ~Additional Equipment: guideliner  ~Pre-dilation Balloon: 3.0x25  ~Drug Eluting Stent: 3.5x28 xience, 2.5x12 xience  ~Post-dilation Balloon: 3.75x15 nc  Results of Intervention   Pre - 80-95% stenosis, JELANI 3 flow  Post - 0% stenosis, JELANI 3 flow      MEDICATION AND PROCEDURAL RECONCILIATION:  An independent trained observer pushed medications at my direction. We monitored the patient's level of consciousness and vital signs/physiologic status throughout the procedure duration (see start and stop times below).  Sedation: 8 mg Versed, 100 mcg Fentanyl  Sedation start: 0842  Sedation stop: 0948  Contrast: 124 cc of Isovue   Flouro Time: 11.8 minutes of fluoroscopy     IMPRESSION/SUMMARY  ~Coronary Angiography w/ sever single vessel CAD  ~LVG with LVEF of 65% and no regional wall motion abnormalities  ~Successful complex angioplasty and stenting of LAD    RECOMMENDATION:  ~Aggressive medical treatment and risk factor modification  ~1. Post cath IVF. Bedrest.   2. Recommend igh potency statin, aspirin and plavix for 6-12 months. No ace/arb required given normal LVEF. No beta blocker due to bradycardia.  3. Referral to outpatient cardiac rehab phase II will be deferred until patient follow-up in office and then determine patient safety and appropriateness to proceed  4. Patient has been advised on the importance of regular exercise of at least 20-30 minutes daily.   5. Patient counseled about and offered assistance for smoking cessation   6. Follow up in 2 weeks with cardiology      Juan A Avila MD, MD   Interventional Cardiology  4/11/2024 11:35 AM

## 2024-04-11 NOTE — PRE SEDATION
Brief Pre-Op Note/Sedation Assessment      Khai Palacios  1960  [id number]  8:35 AM    Planned Procedure: Cardiac Catheterization Procedure  Post Procedure Plan: Return to same level of care  Consent: I have discussed with the patient and/or the patient representative the indication, alternatives, and the possible risks and/or complications of the planned procedure and the anesthesia methods. The patient and/or patient representative appear to understand and agree to proceed.        Chief Complaint:   Chest Pain/Pressure  Anginal Equivalent      Indications for Cath Procedure:  Presentation:  New Onset Angina <= 2 months, Worsening Angina, and Suspected CAD  2.  Anginal Classification within 2 weeks:  CCS III - Symptoms with everyday living activities, i.e., moderate limitation  3.  Angina Symptoms Assessment:  Typical Chest Pain  4.  Heart Failure Class within last 2 weeks:  No symptoms  5.  Cardiovascular Instability:  No    Prior Ischemic Workup/Eval:  Pre-Procedural Medications: Yes: Aspirin, Beta Blockers, and STATIN  2.   Stress Test Completed?  Yes:  Stress or Imaging Studies Performed (within ANY time period):   Type:  Cardiac CTA  Results:  Positive:  Abnormal CTA/MRI Extent of Ischemia:  Intermediate    Does Patient need surgery?  Cath Valve Surgery:  No    Pre-Procedure Medical History:  Vital Signs:  BP (!) 145/83   Pulse 81   Temp 98.4 °F (36.9 °C)   Resp 16   Ht 1.803 m (5' 11\")   Wt 79.4 kg (175 lb)   SpO2 99%   BMI 24.41 kg/m²     Allergies:  No Known Allergies  Medications:    Current Facility-Administered Medications   Medication Dose Route Frequency Provider Last Rate Last Admin    sodium chloride flush 0.9 % injection 5-40 mL  5-40 mL IntraVENous 2 times per day Juan A Avila MD        sodium chloride flush 0.9 % injection 5-40 mL  5-40 mL IntraVENous PRN Juan A Avila MD        0.9 % sodium chloride infusion   IntraVENous PRN Juan A Avila MD           Past Medical History:

## 2024-04-11 NOTE — PROGRESS NOTES
PRE-PROCEDURE    DATE: 4/11/2024 ARRIVAL TO CATH LAB: 6:56 AM    ADMIT SOURCE: Outpatient    ID & ALLERGY BAND: On    CONSENT: Yes    NPO SINCE: Midnight    PULSES:  Right Radial Artery 3+  Right DP 2+  Right PT 2+    IV SITE : Started in left arm.  with fluids infusing at kvo 6:56 AM     SURGERIES PLANNED: No    BLEEDING PROBLEMS: No    BLEEDING RISK: Low Risk <2%    COMPLIANCE: Yes      PATIENT HISTORY    CHIEF COMPLAINT: Chest Pain    EKG:  Yes    Pre CATH Rhythm: Normal Sinus Rhythm    STRESS TEST PREFORMED:  No    CARDIAC CTA PREFORMED:  Yes     Most recent date:  03/04/2024     Results:    Unknown    [place] CORONARY CALCIUM SCORE:   Assessed: Yes     Score: 917    Date: 03/04/2024    ECHO: DATE:  Yes.  Most recent date: 02/27/2024      EF:  60    HYPERTENSION: Yes    DYSLIPIDEMIA: Yes    FAMILY HX OF CAD: Yes    PRIOR MI: No    PRIOR PCI: No    PRIOR CABG: No    CEREBRALVASCULAR DX: No    PERIPHERAL ARTERIAL DISEASE: No    CHRONIC LUNG DISEASE: No    TOBACCO: Cigarettes >10 a Day    DIABETIC: No    CARDIAC ARREST: No    DIALYSIS: No    FRAILTY SCORE: 4 VULNERABLE (while not dependent on others for IADLs, symptoms limit activities)

## 2024-04-11 NOTE — H&P
[facility]  Cardiology Note  [phone]      No chief complaint on file.     History of Present Illness:  Khai Palacios is a 64 y.o. patient PMHx HTN, HLD, palpitations who presented to the office at the request of his PCP JANIS Oneill for cardiac evaluation of chest pain and palpitations.  I have been asked to provide consultation regarding further management and testing. He is retired, used to race motorcycles. He smokes cigarettes >1ppd.    Recent ECHO stable with normal EF. Recent HM normal with episodes of ST.    Today, he reports he has had episodes of weird feeling in his chest, not a true pain, his pulse felt like it was \"skipping\" beats which concerned him. He woke up suddenly once with hands tingling which scared him.  Sometimes he has chest pain when walking his dog, but not every time. He rode his bike the other day with no symptoms. He also reports times of heart racing slowly comes on, dissipates in about 15 minutes; these are not new has had intermittently for many years.  He states symptoms slowly improving since starting B/P med 2/13/24. He quit smoking in the past but then gained weight. He is asking if he is ok to proceed with a colonoscopy.    Past Medical History:   has a past medical history of Neck pain.    Surgical History:   has no past surgical history on file.     Social History:   reports that he has been smoking cigarettes. He started smoking about 48 years ago. He has a 96.4 pack-year smoking history. He does not have any smokeless tobacco history on file. He reports current alcohol use. He reports current drug use. Drug: Marijuana (Weed).     Family History:  Family History   Problem Relation Age of Onset    Dementia Mother     Heart Disease Father     Heart Attack Father     Stroke Neg Hx     Prostate Cancer Neg Hx        Home Medications:  Were reviewed and are listed in nursing record. and/or listed below  Prior to Admission medications    Medication Sig Start Date

## 2024-04-19 NOTE — PROGRESS NOTES
[facility]     Outpatient Follow Up Note    CHIEF COMPLAINT / HPI: Hospital Follow Up secondary to status post coronary artery stenting      Khai Palacios is 64 y.o. male who presents today for a routine follow up after a recent hospitalization.  Subjective:   Khai Palacios has a significant past medical history of HTN, HLD, palpitations He is retired, used to race motorcycles. He smokes cigarettes >1ppd.    Pt was seen in office with c/o chest pain and palpations. ECHO stable with normal EF. HM normal with episodes of ST. Has abnormal CCA. Underwent [facility] 4/11/24 s/p PCI of LAD with GLORIA x2. LVEF 65%, no WMA.     Prior to PCI pt recalls feeling intermittent chest pain throughout chest (hard for him to explain), worst pain was when he was \"casually\" walking his dog. Intermittently feeling like heart was skipping beats.       Pt reports the last two nights has experiencing some chest discomfort. Both occurrences happened after some form of exertion and when he goes to rest that's when he noticed discomfort. Last night felt like an ache in upper right chest. Wondered if it was a snore muscle. Recalls swinging an ax earlier that day (without exertional symptoms but did tire quickly).  Two nights ago felt mid chest discomfort, wonders if it was indigestion. Does not take Tums or antacids.   Both episodes did not feel anything like prior to PCI.   No associated symptoms with either episodes lasting maybe 10-20 min that were self limiting.    Was able to ride a bike 2 days ago and tolerated without exertional discomfort. He feels his stamina is down d/t taking it easy/ inactivity.    There is no SOB/CARPENTER. The patient denies orthopnea/PND. Denies swelling and his weight is stable. The patient is not experiencing palpitations, this resolved since starting BP medications. Denies dizziness.     Since PCI he thinks fatigue and weakness generally has improved. Prior to PCI had no energy, he states that has

## 2024-04-25 ENCOUNTER — OFFICE VISIT (OUTPATIENT)
Dept: CARDIOLOGY CLINIC | Age: 64
End: 2024-04-25
Payer: COMMERCIAL

## 2024-04-25 VITALS
BODY MASS INDEX: 26.04 KG/M2 | HEIGHT: 71 IN | HEART RATE: 79 BPM | SYSTOLIC BLOOD PRESSURE: 120 MMHG | WEIGHT: 186 LBS | DIASTOLIC BLOOD PRESSURE: 64 MMHG | OXYGEN SATURATION: 98 %

## 2024-04-25 DIAGNOSIS — Z09 HOSPITAL DISCHARGE FOLLOW-UP: ICD-10-CM

## 2024-04-25 DIAGNOSIS — Z72.0 TOBACCO ABUSE: ICD-10-CM

## 2024-04-25 DIAGNOSIS — I10 HYPERTENSION, UNSPECIFIED TYPE: ICD-10-CM

## 2024-04-25 DIAGNOSIS — I25.10 CORONARY ARTERY DISEASE INVOLVING NATIVE HEART WITHOUT ANGINA PECTORIS, UNSPECIFIED VESSEL OR LESION TYPE: Primary | ICD-10-CM

## 2024-04-25 DIAGNOSIS — E78.2 MIXED HYPERLIPIDEMIA: ICD-10-CM

## 2024-04-25 PROCEDURE — 99214 OFFICE O/P EST MOD 30 MIN: CPT | Performed by: NURSE PRACTITIONER

## 2024-04-25 PROCEDURE — 3078F DIAST BP <80 MM HG: CPT | Performed by: NURSE PRACTITIONER

## 2024-04-25 PROCEDURE — 1111F DSCHRG MED/CURRENT MED MERGE: CPT | Performed by: NURSE PRACTITIONER

## 2024-04-25 PROCEDURE — 3074F SYST BP LT 130 MM HG: CPT | Performed by: NURSE PRACTITIONER

## 2024-04-25 NOTE — PATIENT INSTRUCTIONS
Continue current medications     Cholesterol soon per PCP as planned. LDL goal <70.    Continue to try to stop smoking.      Eat less of these foods  Potato chips, french fries, and other “junk” foods  Vegetables cooked in butter, cheese, or cream sauces  Fried foods  Full fat dairy products   Keller, sausage, and organ meats (like liver)  Egg yolks  Cheesecake, pastries, doughnuts, ice cream  Butter and margarine  Sweetened drinks   Tropical oils such as coconut and palm oil  Mediterranean-like diet    Eat more of these foods  Whole-grain breads and pasta, brown rice, whole-grain bagels  Fresh, frozen, baked, or steamed fruits and vegetables  Steamed, baked, or fresh foods  Fat-free dairy products   Fish, skinless poultry, lean cuts of meat (with fat trimmed away), soy products  Egg whites, egg substitutes  Dessert examples: Pedro food cake, fig bars, animal crackers, fabian crackers, air-popped popcorn, low-fat frozen desserts (yogurt, sherbet, ice milk)  Olive oil or canola oil (in small amounts)    The American Heart Association offers these guidelines for how much fat to include in a heart-healthy diet:  Type of fat Recommendation   Saturated fat Less than 6% of total daily calories.* If you're eating 2,000 calories a day, that's about 11 to 13 grams.   Trans fat Avoid       Exercise Recommendations for Adults  Get at least 150 minutes per week of moderate-intensity aerobic activity or 75 minutes per week of aerobic activity, or a combination of both, preferably spread throughout the week.  Add moderate intensity muscle-strengthening activity (such as resistance or weights) on at least 2 days per week.  Spend less time sitting. Even light-intensity activity can offset some of the risks of being sedentary.  Gain even more benefits by being active at least 300 minutes (5 hours) per week.  Increase amount and intensity gradually over time. Start out slow.

## 2024-05-10 ENCOUNTER — TELEPHONE (OUTPATIENT)
Dept: CASE MANAGEMENT | Age: 64
End: 2024-05-10

## 2024-06-04 ENCOUNTER — TELEPHONE (OUTPATIENT)
Dept: CARDIOLOGY CLINIC | Age: 64
End: 2024-06-04

## 2024-06-04 NOTE — TELEPHONE ENCOUNTER
Pt Underwent [facility] 4/11/24 s/p PCI of LAD with GLORIA x2. LVEF 65%, no WMA with Dr. Avila.     Called today to inform us he had a syncopal episode went by EMS to [facility], received IVF, and labs work. Pt states orthostatic blood pressures were completed and his BP dropped with standing. They considered overnight observation, but pt was improved and left with MD approval.    Today he is okay, not good, but not bad. Really tired this morning. Has been experiencing weakness and decrease in energy over the past couple weeks. Minor pain in chest every once in a while, random not associated with activity or rest, but not bad enough he feels like it needs mentioned. SOB on occasion, was able to mow grass yesterday without issue. Pt has been compliant with hydrating and cardiac diet. Reviewed medications, pt is compliant with: Asa 81, lipitor 40, Plavix 75, losartan 50 daily.     Reviewed when to seek emergent care, slow position changes, and how to check BP at home.    Informed pt I would discuss with ADM and return call with plan tomorrow. Pt verbalized understanding.

## 2024-06-04 NOTE — TELEPHONE ENCOUNTER
Pt called to state he is getting dizzy when he stands up. The other day he got up got dizzy made it to the couch and set down. It was this time he passed out and wife called 911. Pt has Stents that were placed about 5 weeks ago. Pt also having low BP. Thinks it might be a cause of BP meds and Blood Thinners. Pt would like a call back to discuss. Khai number is [phone]. Please advise.

## 2024-06-04 NOTE — PROGRESS NOTES
2024  TELEHEALTH EVALUATION -- Audio/Visual    Khai Palacios, was evaluated through a synchronous (real-time) audio-video encounter. The patient (or guardian if applicable) is aware that this is a billable service, which includes applicable co-pays. This Virtual Visit was conducted with patient's (and/or legal guardian's) consent. Patient identification was verified, and a caregiver was present when appropriate.   The patient was located at Home: [address] [place] [address] [place] [address]  Provider was located at Home (Appt Dept State): [place]  Confirm you are appropriately licensed, registered, or certified to deliver care in the state where the patient is located as indicated above. If you are not or unsure, please re-schedule the visit: Yes, I confirm.      Total time spent for this encounter: Not billed by time    --Kelsey Oneill, JANIS - CNP on 2024 at 1:51 PM    An electronic signature was used to authenticate this note.    Subjective:  Chief Complaint   Patient presents with    Follow-up     Low BP intermittently     HPI:   Khai Palacios (:  1960) has requested an audio/video evaluation for the following concern(s):    Patient presents for an ER follow-up.  He was seen in the emergency room on 6/3/2024 regarding a syncopal episode.  Recent coronary stenting 5 weeks ago.  States he is taking his medications as prescribed.  Labs were completed and marked as stable.  Orthostatic vitals were positive and he was given a liter of normal saline.  They recommended admission and the patient declined.  ER agreed and recommended patient see cardiology the next morning. He has a call placed to cardiology and is awaiting a call back.    Today, the patient reports he continues to have intermittent weakness but has not passed out since the ER. No other symptoms associated. Pt states he is asymptomatic at this time. States \"I am feeling pretty good right now.\"    Denies vision changes. Denies fevers/chills.

## 2024-06-05 ENCOUNTER — TELEMEDICINE (OUTPATIENT)
Dept: INTERNAL MEDICINE CLINIC | Age: 64
End: 2024-06-05
Payer: COMMERCIAL

## 2024-06-05 ENCOUNTER — TELEPHONE (OUTPATIENT)
Dept: INTERNAL MEDICINE CLINIC | Age: 64
End: 2024-06-05

## 2024-06-05 DIAGNOSIS — I10 PRIMARY HYPERTENSION: ICD-10-CM

## 2024-06-05 DIAGNOSIS — Z87.898 HISTORY OF SYNCOPE: ICD-10-CM

## 2024-06-05 DIAGNOSIS — R53.1 WEAKNESS: ICD-10-CM

## 2024-06-05 DIAGNOSIS — Z91.89 CANDIDATE FOR STATIN THERAPY DUE TO RISK OF FUTURE CARDIOVASCULAR EVENT: ICD-10-CM

## 2024-06-05 DIAGNOSIS — E78.2 MIXED HYPERLIPIDEMIA: Primary | ICD-10-CM

## 2024-06-05 PROCEDURE — G2211 COMPLEX E/M VISIT ADD ON: HCPCS | Performed by: NURSE PRACTITIONER

## 2024-06-05 PROCEDURE — 99214 OFFICE O/P EST MOD 30 MIN: CPT | Performed by: NURSE PRACTITIONER

## 2024-06-05 RX ORDER — ATORVASTATIN CALCIUM 80 MG/1
80 TABLET, FILM COATED ORAL DAILY
Qty: 90 TABLET | Refills: 0 | Status: SHIPPED | OUTPATIENT
Start: 2024-06-05

## 2024-06-05 RX ORDER — LOSARTAN POTASSIUM 25 MG/1
25 TABLET ORAL DAILY
Qty: 30 TABLET | Refills: 0 | Status: SHIPPED | OUTPATIENT
Start: 2024-06-05

## 2024-06-05 ASSESSMENT — ENCOUNTER SYMPTOMS
ABDOMINAL PAIN: 0
VOMITING: 0
DIARRHEA: 0
CONSTIPATION: 0
WHEEZING: 0
SHORTNESS OF BREATH: 0
COUGH: 0
NAUSEA: 0

## 2024-06-05 NOTE — TELEPHONE ENCOUNTER
Message sent back to Cardiologist regarding medication update from pcp's OV today     Kelsey Oneill, APRN - Karin Lugo LPN  Please call the patient's cardiologist.  It appears that the patient called cardiology yesterday and is expecting a return call today.  In the meantime, the patient scheduled with me.  Today, we decreased his dose of losartan to 25 mg by mouth daily.  When I reviewed the last cardiology NP note it appears they wanted the patient to increase to 80 mg of Lipitor daily.  Patient states he was unaware and a new prescription was never sent.  Therefore, he is taking Lipitor 40 mg daily.  I did increase this to 80 mg today. He will have repeat fasting labs in 6 weeks.  Patient still expecting a call from cardiology today, but I wanted to make cardiology aware of the changes we made today in the visit and see if there are any other recommendations on their end.  Patient asymptomatic at this time and feeling better.

## 2024-06-05 NOTE — PATIENT INSTRUCTIONS
In 6 weeks,   Please get your fasting lab work (no food or drink for 10-12 hours prior besides water) completed M-F 730a-430p at our office. [facility] lab has walk-in hours available as well - no appointment is needed. We will call or mychart message you with your results.

## 2024-06-05 NOTE — TELEPHONE ENCOUNTER
Called Khai to review his symptoms and medication changes. His PCP decreased his losartan to 25mg daily. He reported his most recent blood pressure as 96/60. Will speak to him again on Friday to see how he is feeling and how his blood pressures are running. He reports that he has not had significant dizziness or hypotension symptoms today, just fatigue.

## 2024-06-05 NOTE — TELEPHONE ENCOUNTER
Cindy with NP Kelsey Oneill states pt was seen today and NP wanted ADM to know she decreased pt's losartan to 25 mg daily and increased Lipitor to 80 mg daily. She is going to have pt recheck labs in 6 weeks and pt is asymptomatic today and feeling good. Please call NP for any questions, ph [phone].    States pt is still expecting a call from cardiology from yesterdays call.

## 2024-06-07 NOTE — PROGRESS NOTES
Attempted to call Khai x2 to see how his blood pressure was doing and to see if he was still dizzy. No answer. LMOM with callback number.

## 2024-06-12 ENCOUNTER — TELEPHONE (OUTPATIENT)
Dept: INTERNAL MEDICINE CLINIC | Age: 64
End: 2024-06-12

## 2024-06-12 NOTE — TELEPHONE ENCOUNTER
----- Message from Kelsey Oneill, JANIS - CNP sent at 6/5/2024  1:46 PM EDT -----  Please call pt in 1 week for bp log readings. (Adjusted losartan dose). thanks            Note        6/6/24 - 105/66 6/8/24 - 91/57  6/10/24 - 112/69 6/11/24 - 111/69 6/12/24 - 113/68    Patient informed to continue to monitor at home and to let office know if having any symptoms of hypotension or if BP drops back into SBP 90's and continues to stay down.

## 2024-06-12 NOTE — TELEPHONE ENCOUNTER
----- Message from JANIS Disla - CNP sent at 6/5/2024  1:46 PM EDT -----  Please call pt in 1 week for bp log readings. (Adjusted losartan dose). thanks

## 2024-06-12 NOTE — TELEPHONE ENCOUNTER
Pt is calling requesting a lipid panel be ordered. He came in the office to get labs done but they said there is no orders. I see there is some in there that were expected in January and let pt know that. He is worried about his cholesterol because his meds were just changed and he doesn't want to take a doubled dose if he doesn't have to. Please call him back at [phone].

## 2024-06-12 NOTE — TELEPHONE ENCOUNTER
Patient informed there is an active lipid order in chart pt to come in tomorrow to have fasting labs drawn

## 2024-06-13 DIAGNOSIS — Z91.89 CANDIDATE FOR STATIN THERAPY DUE TO RISK OF FUTURE CARDIOVASCULAR EVENT: ICD-10-CM

## 2024-06-13 LAB
CHOLEST SERPL-MCNC: 137 MG/DL (ref 0–199)
HDLC SERPL-MCNC: 45 MG/DL (ref 40–60)
LDL CHOLESTEROL: 76 MG/DL
TRIGL SERPL-MCNC: 78 MG/DL (ref 0–150)
VLDLC SERPL CALC-MCNC: 16 MG/DL

## 2024-06-18 ENCOUNTER — OFFICE VISIT (OUTPATIENT)
Dept: INTERNAL MEDICINE CLINIC | Age: 64
End: 2024-06-18
Payer: COMMERCIAL

## 2024-06-18 VITALS
TEMPERATURE: 97.9 F | DIASTOLIC BLOOD PRESSURE: 70 MMHG | HEART RATE: 84 BPM | SYSTOLIC BLOOD PRESSURE: 126 MMHG | HEIGHT: 71 IN | WEIGHT: 178 LBS | BODY MASS INDEX: 24.92 KG/M2 | OXYGEN SATURATION: 98 %

## 2024-06-18 DIAGNOSIS — F17.200 SMOKER: ICD-10-CM

## 2024-06-18 DIAGNOSIS — E78.2 MIXED HYPERLIPIDEMIA: ICD-10-CM

## 2024-06-18 DIAGNOSIS — Z91.89 CANDIDATE FOR STATIN THERAPY DUE TO RISK OF FUTURE CARDIOVASCULAR EVENT: ICD-10-CM

## 2024-06-18 DIAGNOSIS — I10 PRIMARY HYPERTENSION: Primary | ICD-10-CM

## 2024-06-18 PROCEDURE — 3078F DIAST BP <80 MM HG: CPT | Performed by: NURSE PRACTITIONER

## 2024-06-18 PROCEDURE — 3074F SYST BP LT 130 MM HG: CPT | Performed by: NURSE PRACTITIONER

## 2024-06-18 PROCEDURE — 99214 OFFICE O/P EST MOD 30 MIN: CPT | Performed by: NURSE PRACTITIONER

## 2024-06-18 RX ORDER — LOSARTAN POTASSIUM 25 MG/1
25 TABLET ORAL DAILY
Qty: 90 TABLET | Refills: 0 | Status: SHIPPED | OUTPATIENT
Start: 2024-06-18

## 2024-06-18 ASSESSMENT — ENCOUNTER SYMPTOMS
SHORTNESS OF BREATH: 0
COUGH: 0
CONSTIPATION: 0
NAUSEA: 0
ABDOMINAL PAIN: 0
VOMITING: 0
WHEEZING: 0
DIARRHEA: 0

## 2024-06-18 NOTE — PROGRESS NOTES
Office Visit   2024    Subjective:  Chief Complaint   Patient presents with    3 Month Follow-Up    Hypertension     HPI:  Khai Palacios is a 64 y.o. male who presents to the clinic today for follow up.    Hypertension-losartan dose was adjusted last visit after hypotension was reported.  Now taking losartan 25 mg daily without side effects. taking blood pressure at home and it is runnin24 - 105/66  24 - 91/57  6/10/24 - 112/24 - 111/24 -     Hyperlipidemia-prescribed Lipitor 80 mg daily.  LDL still above 70.  Patient states he is only taking Lipitor 40 mg daily.  States he wanted to see what the level was on the 40 mg first. Denies side effects.  taking ASA and plavix and on statin. Seeing cardiology.    Did have one episode of chest pain last week. States that this resolved spontaneously after 12 minutes and this has not occurred since.    Asymptomatic today. Denies chest pain, palpitations, shortness of breath, trouble breathing, lightheadedness, dizziness or blurred vision. Denies syncope.    Smoker- was smoking 2 ppd. Smoking for 48 years. Smoking 1 ppd. Wants to quit on his own.    Review of Systems   Constitutional:  Negative for chills, fatigue and fever.   Respiratory:  Negative for cough, shortness of breath and wheezing.    Cardiovascular:  Negative for chest pain.   Gastrointestinal:  Negative for abdominal pain, constipation, diarrhea, nausea and vomiting.   Skin:  Negative for pallor and rash.   Neurological:  Negative for dizziness, weakness, light-headedness, numbness and headaches.   Psychiatric/Behavioral:  Negative for dysphoric mood, self-injury, sleep disturbance and suicidal ideas. The patient is not nervous/anxious.      No Known Allergies    Current Outpatient Rx   Medication Sig Dispense Refill    losartan (COZAAR) 25 MG tablet Take 1 tablet by mouth daily 90 tablet 0    atorvastatin (LIPITOR) 80 MG tablet Take 1 tablet by mouth daily (Patient

## 2024-06-18 NOTE — PATIENT INSTRUCTIONS
When you increase to Lipitor 80 mg daily, repeat fasting labs 6 weeks later.  Please get your fasting lab work (no food or drink for 10-12 hours prior besides water) completed M-F 730a-430p at our office. [facility] lab has walk-in hours available as well - no appointment is needed. We will call or [employer] message you with your results.

## 2024-06-28 NOTE — PROGRESS NOTES
Sinus Rhythm, left atrial enlargement.    Echo  2/27/24  -There is mildly increased left ventricular wall thickness.   -Overall left ventricular systolic function appears normal.   -Ejection fraction is visually estimated to be 60-65%.   -No regional wall motion abnormalities are noted.   -Normal diastolic function.   -The aortic valve is thickened but opens well with normal gradients.   -The right ventricle is normal in size and function. TAPSE=2.12 cm RV S   Luis=11.5 cm/s   -RVSP=7 mmHg   -The right atrium is dilated.    Stress: None    LHC w/ FFR  4/11/24    ANGIOGRAM/CORONARY ARTERIOGRAM:     Left main artery Bifurcates into the left anterior descending artery and left circumflex artery nml   Left anterior descending artery Gives rise to 2 diagonal arteries Mid 80%, distal 95%   Diagonals   nml   Left circumflex artery Dominant vessel that gives rise to 2 obtuse marginal arteries posterior descending artery and posterolateral branch nml   Obtuse Marginals   nml      Right coronary artery Non Dominant vessel nml   Posterior descending artery Posterior lateral branch   nml  nml   LEFT VENTRICULOGRAM:  Left ventricular angiogram was done in the 30° CASH projection and revealed  LVEF- 60%  LVG- nml  LVEDP- 4  Aortic pressure- 83/51 There was no gradient between the left ventricle and aorta     FFR of LAD 0.75, comet wire    IMPRESSION/SUMMARY  ~Coronary Angiography w/ sever single vessel CAD  ~LVG with LVEF of 65% and no regional wall motion abnormalities  ~Successful complex angioplasty and stenting of LAD    CCTA  3/27/24  Scattered calcified and noncalcified plaque is seen throughout the coronary  arteries.  In midportion of the LAD, there is bulky calcified plaque with  obscuration of the lumen of the LAD, suspicious for an area flow limiting  stenosis in the midportion. Left anterior descending coronary artery is  visualized to the cardiac apex.     Calcified and noncalcified plaque throughout the circumflex

## 2024-07-15 NOTE — TELEPHONE ENCOUNTER
Received refill request for ASPIRIN from [facility] pharmacy.    Last ov: 04/25/2024 LEVY    Last Refill: 04/11/2024 #90 w/ 0    Next appointment: 07/25/2024 ADM

## 2024-07-18 RX ORDER — ASPIRIN 81 MG/1
81 TABLET, COATED ORAL DAILY
Qty: 90 TABLET | Refills: 3 | Status: SHIPPED | OUTPATIENT
Start: 2024-07-18

## 2024-07-25 ENCOUNTER — OFFICE VISIT (OUTPATIENT)
Dept: CARDIOLOGY CLINIC | Age: 64
End: 2024-07-25
Payer: COMMERCIAL

## 2024-07-25 VITALS
DIASTOLIC BLOOD PRESSURE: 80 MMHG | WEIGHT: 174 LBS | BODY MASS INDEX: 24.36 KG/M2 | HEIGHT: 71 IN | SYSTOLIC BLOOD PRESSURE: 128 MMHG | HEART RATE: 74 BPM | OXYGEN SATURATION: 97 %

## 2024-07-25 DIAGNOSIS — E78.2 MIXED HYPERLIPIDEMIA: ICD-10-CM

## 2024-07-25 DIAGNOSIS — R00.2 PALPITATIONS: ICD-10-CM

## 2024-07-25 DIAGNOSIS — Z72.0 TOBACCO ABUSE: ICD-10-CM

## 2024-07-25 DIAGNOSIS — I25.10 CAD S/P PERCUTANEOUS CORONARY ANGIOPLASTY: Primary | ICD-10-CM

## 2024-07-25 DIAGNOSIS — R55 SYNCOPE AND COLLAPSE: ICD-10-CM

## 2024-07-25 DIAGNOSIS — Z98.61 CAD S/P PERCUTANEOUS CORONARY ANGIOPLASTY: Primary | ICD-10-CM

## 2024-07-25 DIAGNOSIS — I10 HYPERTENSION, UNSPECIFIED TYPE: ICD-10-CM

## 2024-07-25 DIAGNOSIS — I95.9 HYPOTENSION, UNSPECIFIED HYPOTENSION TYPE: ICD-10-CM

## 2024-07-25 PROCEDURE — 3079F DIAST BP 80-89 MM HG: CPT | Performed by: INTERNAL MEDICINE

## 2024-07-25 PROCEDURE — 99214 OFFICE O/P EST MOD 30 MIN: CPT | Performed by: INTERNAL MEDICINE

## 2024-07-25 PROCEDURE — 3074F SYST BP LT 130 MM HG: CPT | Performed by: INTERNAL MEDICINE

## 2024-07-25 NOTE — PATIENT INSTRUCTIONS
- Increase exercise/activity as tolerated. Call if you start to notice chest pain with activity    - Fasting lipid panel in the next 3 months    - Continue Plavix until Oct 11, 2024    Please call the office [phone], or send a message through [employer] with any worsening symptoms, concerns or questions.

## 2024-08-15 DIAGNOSIS — I10 PRIMARY HYPERTENSION: ICD-10-CM

## 2024-08-15 RX ORDER — LOSARTAN POTASSIUM 25 MG/1
25 TABLET ORAL DAILY
Qty: 90 TABLET | Refills: 0 | OUTPATIENT
Start: 2024-08-15

## 2024-08-21 ENCOUNTER — OFFICE VISIT (OUTPATIENT)
Dept: INTERNAL MEDICINE CLINIC | Age: 64
End: 2024-08-21
Payer: COMMERCIAL

## 2024-08-21 VITALS
HEART RATE: 72 BPM | TEMPERATURE: 98.2 F | DIASTOLIC BLOOD PRESSURE: 74 MMHG | BODY MASS INDEX: 24.72 KG/M2 | WEIGHT: 176.6 LBS | OXYGEN SATURATION: 99 % | SYSTOLIC BLOOD PRESSURE: 128 MMHG | HEIGHT: 71 IN

## 2024-08-21 DIAGNOSIS — R91.8 ABNORMAL CT LUNG SCREENING: ICD-10-CM

## 2024-08-21 DIAGNOSIS — R19.5 POSITIVE COLORECTAL CANCER SCREENING USING COLOGUARD TEST: ICD-10-CM

## 2024-08-21 DIAGNOSIS — Z91.89 CANDIDATE FOR STATIN THERAPY DUE TO RISK OF FUTURE CARDIOVASCULAR EVENT: ICD-10-CM

## 2024-08-21 DIAGNOSIS — F17.200 SMOKER: ICD-10-CM

## 2024-08-21 DIAGNOSIS — I10 PRIMARY HYPERTENSION: Primary | ICD-10-CM

## 2024-08-21 DIAGNOSIS — E78.2 MIXED HYPERLIPIDEMIA: ICD-10-CM

## 2024-08-21 PROCEDURE — 99214 OFFICE O/P EST MOD 30 MIN: CPT | Performed by: NURSE PRACTITIONER

## 2024-08-21 PROCEDURE — 3078F DIAST BP <80 MM HG: CPT | Performed by: NURSE PRACTITIONER

## 2024-08-21 PROCEDURE — 3074F SYST BP LT 130 MM HG: CPT | Performed by: NURSE PRACTITIONER

## 2024-08-21 RX ORDER — LOSARTAN POTASSIUM 25 MG/1
25 TABLET ORAL DAILY
Qty: 90 TABLET | Refills: 0 | Status: SHIPPED | OUTPATIENT
Start: 2024-08-21

## 2024-08-21 RX ORDER — ATORVASTATIN CALCIUM 80 MG/1
80 TABLET, FILM COATED ORAL DAILY
Qty: 90 TABLET | Refills: 0 | Status: SHIPPED | OUTPATIENT
Start: 2024-08-21

## 2024-08-21 ASSESSMENT — ENCOUNTER SYMPTOMS
DIARRHEA: 0
ABDOMINAL PAIN: 0
WHEEZING: 0
NAUSEA: 0
VOMITING: 0
CONSTIPATION: 0
COUGH: 0
SHORTNESS OF BREATH: 0

## 2024-08-21 NOTE — PATIENT INSTRUCTIONS
Please get your fasting lab work (no food or drink for 10-12 hours prior besides water) completed M-F 730a-430p at our office. [facility] lab has walk-in hours available as well - no appointment is needed. We will call or mychart message you with your results.     Call to schedule colonoscopy.  Call [address] [place] [address] to schedule CT lung.

## 2024-08-21 NOTE — PROGRESS NOTES
involving native heart without angina pectoris        Wt Readings from Last 3 Encounters:   08/21/24 80.1 kg (176 lb 9.6 oz)   07/25/24 78.9 kg (174 lb)   06/18/24 80.7 kg (178 lb)     BP Readings from Last 3 Encounters:   08/21/24 128/74   07/25/24 128/80   06/18/24 126/70     The 10-year ASCVD risk score (Marisol SUGGS, et al., 2019) is: 14.8%    Values used to calculate the score:      Age: 64 years      Sex: Male      Is Non- : No      Diabetic: No      Tobacco smoker: Yes      Systolic Blood Pressure: 128 mmHg      Is BP treated: Yes      HDL Cholesterol: 45 mg/dL      Total Cholesterol: 137 mg/dL    Objective/Physical Exam:  /74   Pulse 72   Temp 98.2 °F (36.8 °C)   Ht 1.803 m (5' 11\")   Wt 80.1 kg (176 lb 9.6 oz)   SpO2 99%   BMI 24.63 kg/m²   Body mass index is 24.63 kg/m².  Physical Exam  Vitals reviewed.   Constitutional:       General: He is not in acute distress.     Appearance: He is well-developed. He is not diaphoretic.   HENT:      Head: Normocephalic and atraumatic.   Eyes:      Pupils: Pupils are equal, round, and reactive to light.   Cardiovascular:      Rate and Rhythm: Normal rate and regular rhythm.   Pulmonary:      Effort: Pulmonary effort is normal. No respiratory distress.      Breath sounds: Normal breath sounds. No wheezing or rales.   Chest:      Chest wall: No tenderness.   Abdominal:      General: Bowel sounds are normal.      Palpations: Abdomen is soft.   Skin:     General: Skin is warm and dry.      Coloration: Skin is not pale.      Findings: No erythema or rash.   Neurological:      Mental Status: He is alert and oriented to person, place, and time.      Coordination: Coordination normal.   Psychiatric:         Mood and Affect: Mood normal.       Assessment and Plan:  Khai was seen today for follow-up and hypertension.  Diagnoses and all orders for this visit:    Primary hypertension   - Blood pressure well-controlled.  Asymptomatic.  Denies side

## 2024-08-23 DIAGNOSIS — E78.2 MIXED HYPERLIPIDEMIA: ICD-10-CM

## 2024-08-23 LAB
ALBUMIN SERPL-MCNC: 4.3 G/DL (ref 3.4–5)
ALBUMIN/GLOB SERPL: 1.9 {RATIO} (ref 1.1–2.2)
ALP SERPL-CCNC: 95 U/L (ref 40–129)
ALT SERPL-CCNC: 22 U/L (ref 10–40)
ANION GAP SERPL CALCULATED.3IONS-SCNC: 11 MMOL/L (ref 3–16)
AST SERPL-CCNC: 21 U/L (ref 15–37)
BILIRUB SERPL-MCNC: 0.5 MG/DL (ref 0–1)
BUN SERPL-MCNC: 10 MG/DL (ref 7–20)
CALCIUM SERPL-MCNC: 9.1 MG/DL (ref 8.3–10.6)
CHLORIDE SERPL-SCNC: 105 MMOL/L (ref 99–110)
CHOLEST SERPL-MCNC: 128 MG/DL (ref 0–199)
CO2 SERPL-SCNC: 23 MMOL/L (ref 21–32)
CREAT SERPL-MCNC: 0.9 MG/DL (ref 0.8–1.3)
GFR SERPLBLD CREATININE-BSD FMLA CKD-EPI: >90 ML/MIN/{1.73_M2}
GLUCOSE SERPL-MCNC: 88 MG/DL (ref 70–99)
HDLC SERPL-MCNC: 34 MG/DL (ref 40–60)
LDL CHOLESTEROL: 78 MG/DL
POTASSIUM SERPL-SCNC: 4.2 MMOL/L (ref 3.5–5.1)
PROT SERPL-MCNC: 6.6 G/DL (ref 6.4–8.2)
SODIUM SERPL-SCNC: 139 MMOL/L (ref 136–145)
TRIGL SERPL-MCNC: 82 MG/DL (ref 0–150)
VLDLC SERPL CALC-MCNC: 16 MG/DL

## 2024-08-30 ENCOUNTER — TELEPHONE (OUTPATIENT)
Dept: INTERNAL MEDICINE CLINIC | Age: 64
End: 2024-08-30

## 2024-08-30 DIAGNOSIS — R91.8 ABNORMAL CT LUNG SCREENING: Primary | ICD-10-CM

## 2024-08-30 NOTE — TELEPHONE ENCOUNTER
Rona from [employer] calling in. Order in chart for low dose CT chest in has an expiration date of 9/6/24. She cannot schedule pt d/t once insurance is contacted for approval, it will be after the 9/6. Can a new order be placed? Rona doesn't need a call back but pt will need to be called once a new order is placed so he can call back to schedule, thanks.

## 2024-09-13 ENCOUNTER — TELEPHONE (OUTPATIENT)
Dept: INTERNAL MEDICINE CLINIC | Age: 64
End: 2024-09-13

## 2024-09-18 ENCOUNTER — HOSPITAL ENCOUNTER (OUTPATIENT)
Dept: CT IMAGING | Age: 64
Discharge: HOME | End: 2024-09-18
Payer: COMMERCIAL

## 2024-09-18 DIAGNOSIS — R91.8 ABNORMAL CT LUNG SCREENING: ICD-10-CM

## 2024-09-18 PROCEDURE — 71250 CT THORAX DX C-: CPT

## 2024-09-19 ENCOUNTER — TELEPHONE (OUTPATIENT)
Dept: INTERNAL MEDICINE CLINIC | Age: 64
End: 2024-09-19

## 2024-09-20 DIAGNOSIS — R91.8 ABNORMAL CT LUNG SCREENING: Primary | ICD-10-CM

## 2024-09-30 ENCOUNTER — OFFICE VISIT (OUTPATIENT)
Dept: PULMONOLOGY | Age: 64
End: 2024-09-30
Payer: COMMERCIAL

## 2024-09-30 VITALS
BODY MASS INDEX: 24.72 KG/M2 | SYSTOLIC BLOOD PRESSURE: 116 MMHG | WEIGHT: 176.6 LBS | HEART RATE: 78 BPM | HEIGHT: 71 IN | DIASTOLIC BLOOD PRESSURE: 54 MMHG | OXYGEN SATURATION: 99 %

## 2024-09-30 DIAGNOSIS — R93.89 ABNORMAL CT SCAN, CHEST: Primary | ICD-10-CM

## 2024-09-30 DIAGNOSIS — F17.210 CIGARETTE NICOTINE DEPENDENCE WITHOUT COMPLICATION: ICD-10-CM

## 2024-09-30 DIAGNOSIS — R06.02 SOB (SHORTNESS OF BREATH): ICD-10-CM

## 2024-09-30 PROCEDURE — 99204 OFFICE O/P NEW MOD 45 MIN: CPT | Performed by: INTERNAL MEDICINE

## 2024-09-30 NOTE — PROGRESS NOTES
PULMONARY OFFICE NEW PATIENT VISIT    CONSULTING PHYSICIAN:  Kelsey Oneill APRN - CNP     REASON FOR VISIT:   Chief Complaint   Patient presents with    New Patient     Ref: Kelsey Gan CNP     Abnormal CT       DATE OF VISIT: 9/30/2024    HISTORY OF PRESENT ILLNESS: 64 y.o. year old male comes into the pulmonary office for the first time.  Recently had a low-dose CT scan done which revealed abnormal findings.  Patient has been referred to pulmonary office for evaluation.  On my questioning patient denies any concerning respiratory symptoms.  Does have occasional shortness of breath with heavy exertion but quickly recovers.  Denies any wheezing, cough associated.  No discolored expectoration.  No anorexia or weight loss.  Currently smoking about 1 pack of cigarettes daily.  Used to smoke about 2 packs/day.  Has roughly about 95 pack years of smoking.  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  Recently has suffered with coronary artery disease requiring stenting and has been on aspirin and Plavix.  Denies any arthralgias, photosensitive rash, oral ulcers, alopecia or Raynaud's phenomena.      REVIEW OF SYSTEMS:   CONSTITUTIONAL SYMPTOMS: The patient denies fever, fatigue, night sweats, weight loss or weight gain.   HEENT: No vision changes. No tinnitus, Denies sinus pain. No hoarseness, or dysphagia.   NECK: Patient denies swelling in the neck.   CARDIOVASCULAR: Denies chest pain, palpitation, syncope.  RESPIRATORY: As per HPI.  GASTROINTESTINAL: Denies nausea, abdominal pain or change in bowel function.  GENITOURINARY: Denies obstructive symptoms. No history of incontinence.  BREASTS: No masses or lumps in the breasts.   SKIN: No rashes or itching.   MUSCULOSKELETAL: Denies weakness or bone pain.   NEUROLOGICAL: No headaches or seizures.   PSYCHIATRIC: Denies mood swings or depression.   ENDOCRINE:

## 2024-10-10 ENCOUNTER — HOSPITAL ENCOUNTER (OUTPATIENT)
Dept: PULMONOLOGY | Age: 64
Discharge: HOME | End: 2024-10-10
Attending: INTERNAL MEDICINE
Payer: COMMERCIAL

## 2024-10-10 DIAGNOSIS — R06.02 SOB (SHORTNESS OF BREATH): ICD-10-CM

## 2024-10-10 LAB
DLCO %PRED: 69 %
DLCO PRED: NORMAL
DLCO/VA %PRED: NORMAL
DLCO/VA PRED: NORMAL
DLCO/VA: NORMAL
DLCO: NORMAL
EXPIRATORY TIME-POST: NORMAL
EXPIRATORY TIME: NORMAL
FEF 25-75 %CHNG: NORMAL
FEF 25-75 POST %PRED: NORMAL
FEF 25-75% %PRED-PRE: NORMAL
FEF 25-75% PRED: NORMAL
FEF 25-75-POST: NORMAL
FEF 25-75-PRE: NORMAL
FEV1 %PRED-POST: NORMAL
FEV1 %PRED-PRE: 102 %
FEV1 PRED: NORMAL
FEV1-POST: NORMAL
FEV1-PRE: NORMAL
FEV1/FVC %PRED-POST: NORMAL
FEV1/FVC %PRED-PRE: NORMAL
FEV1/FVC PRED: NORMAL
FEV1/FVC-POST: NORMAL
FEV1/FVC-PRE: 75 %
FVC %PRED-POST: NORMAL
FVC %PRED-PRE: NORMAL
FVC PRED: NORMAL
FVC-POST: NORMAL
FVC-PRE: NORMAL
GAW %PRED: NORMAL
GAW PRED: NORMAL
GAW: NORMAL
IC PRE %PRED: NORMAL
IC PRED: NORMAL
IC: NORMAL
MEP: NORMAL
MIP: NORMAL
MVV %PRED-PRE: NORMAL
MVV PRED: NORMAL
MVV-PRE: NORMAL
PEF %PRED-POST: NORMAL
PEF %PRED-PRE: NORMAL
PEF PRED: NORMAL
PEF%CHNG: NORMAL
PEF-POST: NORMAL
PEF-PRE: NORMAL
RAW %PRED: NORMAL
RAW PRED: NORMAL
RAW: NORMAL
RV PRE %PRED: NORMAL
RV PRED: NORMAL
RV: NORMAL
SVC %PRED: NORMAL
SVC PRED: NORMAL
SVC: NORMAL
TLC PRE %PRED: 119 %
TLC PRED: NORMAL
TLC: NORMAL
VA %PRED: NORMAL
VA PRED: NORMAL
VA: NORMAL
VTG %PRED: NORMAL
VTG PRED: NORMAL
VTG: NORMAL

## 2024-10-10 PROCEDURE — 94726 PLETHYSMOGRAPHY LUNG VOLUMES: CPT

## 2024-10-10 PROCEDURE — 94010 BREATHING CAPACITY TEST: CPT

## 2024-10-10 PROCEDURE — 94729 DIFFUSING CAPACITY: CPT

## 2024-10-10 PROCEDURE — 94760 N-INVAS EAR/PLS OXIMETRY 1: CPT

## 2024-10-10 ASSESSMENT — PULMONARY FUNCTION TESTS
FEV1_PERCENT_PREDICTED_PRE: 102
FEV1/FVC_PRE: 75

## 2024-10-11 NOTE — PROCEDURES
Pulmonary Function Testing      Patient name:  Khai Palacios      Unit #:   [id number]   Date of test: 10/10/2024  Date of interpretation:   10/11/2024    Mr. Khai Palacios is a 64 y.o. year-old current smoker. The spirometry data were acceptable and reproducible.     Spirometry:  Flow volume loops were normal. The FEV-1/FVC ratio was normal. The   prebronchodilator  FEV-1 was 3.58 liters (102% of predicted), which was normal. The FVC was 4.76 liters (102% of predicted), which was normal. Response to inhaled bronchodilators (albuterol) was not performed.    Lung volumes:  Lung volumes were tested by plethysmography. The total lung capacity was 7.84 liters (119% of predicted), which was normal. The residual volume was 3.06 liters (126% of predicted), which was increased. The ratio of residual volume to total lung capacity (RV/TLC) was 102, which was normal. Specific airway resistance was increased.    Diffusion capacity was found to be decreased, 69%.       Interpretation:  Decreased diffusion capacity.  Normal spirometry and lung volumes.  Differentials include pulmonary hypertension, early interstitial lung disease, emphysema or anemia.  Clinical correlation recommended.      Karen Santiago MD  [facility]   [address] , [address], [place] [address]

## 2024-11-06 ENCOUNTER — TELEPHONE (OUTPATIENT)
Dept: CARDIOLOGY CLINIC | Age: 64
End: 2024-11-06

## 2024-11-06 DIAGNOSIS — Z91.89 CANDIDATE FOR STATIN THERAPY DUE TO RISK OF FUTURE CARDIOVASCULAR EVENT: ICD-10-CM

## 2024-11-06 NOTE — TELEPHONE ENCOUNTER
Pt asking for a call to advise about stopping plavix that was noted on 7/25/24 office note. Please call to advise.

## 2024-11-06 NOTE — TELEPHONE ENCOUNTER
Spoke to pt about message below. Pt wanted confirmation about if he should stop or continue his plavix. I told the pt it did look like he was supposed to stop his plavix in October, but I just wanted to confirm this information.    While on the phone the pt informed me  that he as been low energy since his stents had been place and has it will feel like his b/p is low, he has gotten low reading of 98/54, but pt states his average reading is 120/76.    Please Advise

## 2024-11-06 NOTE — TELEPHONE ENCOUNTER
Stents were placed 4/11/24. Last office note 7/25/24 reads OK to stop Plavix in Oct. Return in 6 months.    Next OV is scheduled 2/17/24.    I spoke to Khai and went over the ok to stop the Plavix. He said he thought he was supposed to see Dr. Woodward first to discuss coming off of it but \"maybe I heard wrong.\"    He understands our office staff will reach out to him tomorrow for clarification & address his other concerns after Dr. Woodward reviews this message.

## 2024-11-07 RX ORDER — ATORVASTATIN CALCIUM 80 MG/1
80 TABLET, FILM COATED ORAL DAILY
Qty: 90 TABLET | Refills: 3 | Status: SHIPPED | OUTPATIENT
Start: 2024-11-07

## 2024-11-07 NOTE — TELEPHONE ENCOUNTER
Spoke with patient. Advised Plavix stop date was Oct 11 th, ok to discontinue per ADM.  Patient states he has been taking Lipitor 40 mg, Lipitor 80 mg currently ordered. Discussed importance of high dose statin given history. New order sent to pharmacy. Patient agreeable. Encouraged to call back with further questions. Patient verbalized understanding.

## 2024-11-15 ENCOUNTER — TELEPHONE (OUTPATIENT)
Dept: INTERNAL MEDICINE CLINIC | Age: 64
End: 2024-11-15

## 2024-11-15 DIAGNOSIS — I10 PRIMARY HYPERTENSION: ICD-10-CM

## 2024-11-15 NOTE — TELEPHONE ENCOUNTER
Pt wife calling--pt wanting to establish with a Dr ---he is currently your pt wants to switch to Dr Sanford ---would that be ok? Please advise. Please let pt wife know. Thanks.

## 2024-11-22 DIAGNOSIS — I10 PRIMARY HYPERTENSION: ICD-10-CM

## 2024-11-22 RX ORDER — LOSARTAN POTASSIUM 25 MG/1
25 TABLET ORAL DAILY
Qty: 90 TABLET | OUTPATIENT
Start: 2024-11-22

## 2024-11-22 RX ORDER — LOSARTAN POTASSIUM 25 MG/1
25 TABLET ORAL DAILY
Qty: 30 TABLET | Refills: 0 | Status: SHIPPED | OUTPATIENT
Start: 2024-11-22

## 2024-12-04 ENCOUNTER — OFFICE VISIT (OUTPATIENT)
Dept: INTERNAL MEDICINE CLINIC | Age: 64
End: 2024-12-04
Payer: COMMERCIAL

## 2024-12-04 VITALS
HEART RATE: 67 BPM | OXYGEN SATURATION: 99 % | WEIGHT: 177.2 LBS | DIASTOLIC BLOOD PRESSURE: 86 MMHG | BODY MASS INDEX: 24.81 KG/M2 | SYSTOLIC BLOOD PRESSURE: 130 MMHG | HEIGHT: 71 IN

## 2024-12-04 DIAGNOSIS — E78.2 MIXED HYPERLIPIDEMIA: ICD-10-CM

## 2024-12-04 DIAGNOSIS — Z76.89 ENCOUNTER TO ESTABLISH CARE WITH NEW DOCTOR: ICD-10-CM

## 2024-12-04 DIAGNOSIS — I25.10 CORONARY ARTERY DISEASE INVOLVING NATIVE CORONARY ARTERY OF NATIVE HEART WITHOUT ANGINA PECTORIS: ICD-10-CM

## 2024-12-04 DIAGNOSIS — F17.200 SMOKER: ICD-10-CM

## 2024-12-04 DIAGNOSIS — I10 PRIMARY HYPERTENSION: Primary | ICD-10-CM

## 2024-12-04 PROCEDURE — 3079F DIAST BP 80-89 MM HG: CPT

## 2024-12-04 PROCEDURE — 99212 OFFICE O/P EST SF 10 MIN: CPT

## 2024-12-04 PROCEDURE — 3075F SYST BP GE 130 - 139MM HG: CPT

## 2024-12-04 ASSESSMENT — ENCOUNTER SYMPTOMS
BACK PAIN: 0
SHORTNESS OF BREATH: 0
TROUBLE SWALLOWING: 0
VOMITING: 0
WHEEZING: 0
ABDOMINAL PAIN: 0
DIARRHEA: 0
CHEST TIGHTNESS: 0
NAUSEA: 0
COUGH: 0
CONSTIPATION: 0
SORE THROAT: 0

## 2024-12-04 NOTE — ASSESSMENT & PLAN NOTE
[facility] 4/11/24: Underwent [facility] 4/11/24 s/p PCI of LAD with GLORIA x2. LVEF 65%, no WMA with Dr. Avila. Had  2/2024> 27% ST, PVC's Asymptomatic at this time. Followed by cardiology, last seen 7/25/24, notes reviewed. Off plavix now. On ASA, losartan and atorvastatin.

## 2024-12-04 NOTE — PROGRESS NOTES
Khai Palacios (:  1960) is a 64 y.o. male,New patient, here for evaluation of the following chief complaint(s):  Established New Doctor (Establish care)      Assessment & Plan   ASSESSMENT/PLAN:  Assessment & Plan  Coronary artery disease involving native coronary artery of native heart without angina pectoris  [facility] 24: Underwent [facility] 24 s/p PCI of LAD with GLORIA x2. LVEF 65%, no WMA with Dr. Avila. Had HM 2024> 27% ST, PVC's Asymptomatic at this time. Followed by cardiology, last seen 24, notes reviewed. Off plavix now. On ASA, losartan and atorvastatin.  Primary hypertension  Well controlled. Had symptomatic hypotensive episodes before, losartan was decreased. Continue with losartan 25 mg for now. Titrate as needed.   Mixed hyperlipidemia  Lipid levels 24 unremarkable. At goal. C/w statin at current dose.     Smoker   Counseled on smoking cessation. Refused resources. Trying to quit on his own.   Encounter to establish care with new doctor  Discussed age appropriate preventive care including healthy diet, daily exercise, immunizations and age & gender guided screening tests.     Declines flu shot.     AAA: will screen at 65.     Colorectal cancer: was scheduled but couldn't complete it due to MI. Provided with information to schedule colonoscopy.       Return in about 6 months (around 2025).         Subjective   SUBJECTIVE/OBJECTIVE:    Lab Review   Lab Results   Component Value Date/Time     2024 11:14 AM     2024 06:59 AM     2024 10:23 AM    K 4.2 2024 11:14 AM    K 4.0 2024 06:59 AM    K 4.3 2024 10:23 AM    CO2 23 2024 11:14 AM    CO2 24 2024 06:59 AM    CO2 26 2024 10:23 AM    BUN 10 2024 11:14 AM    BUN 11 2024 06:59 AM    BUN 9 2024 10:23 AM    CREATININE 0.9 2024 11:14 AM    CREATININE 0.9 2024 06:59 AM    CREATININE 1.1 2024 08:13 AM    GLUCOSE 88 2024 11:14

## 2024-12-19 ENCOUNTER — OFFICE VISIT (OUTPATIENT)
Dept: PULMONOLOGY | Age: 64
End: 2024-12-19

## 2024-12-19 VITALS
DIASTOLIC BLOOD PRESSURE: 66 MMHG | OXYGEN SATURATION: 97 % | HEART RATE: 88 BPM | BODY MASS INDEX: 24.16 KG/M2 | WEIGHT: 172.6 LBS | HEIGHT: 71 IN | SYSTOLIC BLOOD PRESSURE: 110 MMHG

## 2024-12-19 DIAGNOSIS — F17.210 CIGARETTE NICOTINE DEPENDENCE WITHOUT COMPLICATION: ICD-10-CM

## 2024-12-19 DIAGNOSIS — J41.0 SIMPLE CHRONIC BRONCHITIS (HCC): Primary | ICD-10-CM

## 2024-12-19 DIAGNOSIS — J84.10 PULMONARY FIBROSIS (HCC): ICD-10-CM

## 2024-12-19 NOTE — PROGRESS NOTES
to start on inhalers.  -I advised him to be on the look out for any worsening symptoms in which case I can discuss starting on inhaler therapy.    Return in about 6 months (around 6/19/2025) for COPD, ILD.         Gume Han MD, [facility]  [address], [address], [place] [address]  12/19/2024, 9:46 AM    This note was completed using dragon medical speech recognition software. Grammatical errors, random word insertions, pronoun errors and incomplete sentences are occasional consequences of this technology due to software limitations. If there are questions or concerns about the content of this note of information contained within the body of this dictation they should be addressed with the provider for clarification.

## 2025-01-18 DIAGNOSIS — I10 PRIMARY HYPERTENSION: ICD-10-CM

## 2025-01-20 RX ORDER — LOSARTAN POTASSIUM 25 MG/1
25 TABLET ORAL DAILY
Qty: 90 TABLET | Refills: 0 | Status: SHIPPED | OUTPATIENT
Start: 2025-01-20

## 2025-01-24 NOTE — PROGRESS NOTES
[facility]  Cardiology Note  [phone]      Chief Complaint   Patient presents with    Follow-up     6 months. Concerns about side effects Lipitor.  Episode over a month ago- right sided chest pain at rest episode last about 30-45 mins.     Hyperlipidemia    Coronary Artery Disease      History of Present Illness:  Khai Palacios is a 65 y.o. patient PMHx HTN, HLD, palpitations who presented to the office at the request of his PCP JANIS Oneill for cardiac evaluation of chest pain and palpitations. He is retired, used to race motorcycles. He smokes cigarettes >1ppd.    Recent ECHO stable with normal EF. Recent HM normal with episodes of ST.    OV 3/6/24: reports he has had episodes of weird feeling in his chest, not a true pain, his pulse felt like it was \"skipping\" beats which concerned him. He woke up suddenly once with hands tingling which scared him.  Sometimes he has chest pain when walking his dog, but not every time. He rode his bike the other day with no symptoms. He also reports times of heart racing slowly comes on, dissipates in about 15 minutes; these are not new has had intermittently for many years.  He states symptoms slowly improving since starting B/P med 2/13/24. He quit smoking in the past but then gained weight. He is asking if he is ok to proceed with a colonoscopy.    [facility] 4/11/24: Underwent [facility] 4/11/24 s/p PCI of LAD with GLORIA x2. LVEF 65%, no WMA with Dr. Avila.    Pt with syncopal episode d/t hypotension, seen in ED, given IVF prior to d/c. His PCP reduced his losartan to 25 mg daily and increased lipitor to 80 mg daily. PCP follow up pt notes improved BP and symptoms.     07/25/24 OV: Reports he felt really well for 2.5 weeks post PCI, then started having bouts of chest pain on and off. This is now resolved. He does feel like he is slowly losing energy.     Today, patient is here for 6 month follow up. He had one episode of r sided chest pain while at rest a few weeks ago

## 2025-02-17 ENCOUNTER — OFFICE VISIT (OUTPATIENT)
Dept: CARDIOLOGY CLINIC | Age: 65
End: 2025-02-17
Payer: COMMERCIAL

## 2025-02-17 VITALS
OXYGEN SATURATION: 97 % | WEIGHT: 180 LBS | HEART RATE: 68 BPM | BODY MASS INDEX: 25.2 KG/M2 | DIASTOLIC BLOOD PRESSURE: 82 MMHG | HEIGHT: 71 IN | SYSTOLIC BLOOD PRESSURE: 128 MMHG

## 2025-02-17 DIAGNOSIS — Z91.89 CANDIDATE FOR STATIN THERAPY DUE TO RISK OF FUTURE CARDIOVASCULAR EVENT: ICD-10-CM

## 2025-02-17 DIAGNOSIS — Z98.61 CAD S/P PERCUTANEOUS CORONARY ANGIOPLASTY: Primary | ICD-10-CM

## 2025-02-17 DIAGNOSIS — I25.10 CAD S/P PERCUTANEOUS CORONARY ANGIOPLASTY: Primary | ICD-10-CM

## 2025-02-17 DIAGNOSIS — I10 HYPERTENSION, UNSPECIFIED TYPE: ICD-10-CM

## 2025-02-17 DIAGNOSIS — Z72.0 TOBACCO ABUSE: ICD-10-CM

## 2025-02-17 DIAGNOSIS — R00.2 PALPITATIONS: ICD-10-CM

## 2025-02-17 DIAGNOSIS — E78.2 MIXED HYPERLIPIDEMIA: ICD-10-CM

## 2025-02-17 PROCEDURE — 3074F SYST BP LT 130 MM HG: CPT | Performed by: INTERNAL MEDICINE

## 2025-02-17 PROCEDURE — 3079F DIAST BP 80-89 MM HG: CPT | Performed by: INTERNAL MEDICINE

## 2025-02-17 PROCEDURE — G2211 COMPLEX E/M VISIT ADD ON: HCPCS | Performed by: INTERNAL MEDICINE

## 2025-02-17 PROCEDURE — 99213 OFFICE O/P EST LOW 20 MIN: CPT | Performed by: INTERNAL MEDICINE

## 2025-02-17 RX ORDER — BEMPEDOIC ACID AND EZETIMIBE 180; 10 MG/1; MG/1
1 TABLET, FILM COATED ORAL DAILY
Qty: 90 TABLET | Refills: 3 | Status: SHIPPED | OUTPATIENT
Start: 2025-02-17

## 2025-02-17 RX ORDER — ATORVASTATIN CALCIUM 80 MG/1
80 TABLET, FILM COATED ORAL DAILY
Qty: 90 TABLET | Refills: 3 | Status: SHIPPED | OUTPATIENT
Start: 2025-02-17

## 2025-04-29 DIAGNOSIS — I10 PRIMARY HYPERTENSION: ICD-10-CM

## 2025-04-29 RX ORDER — LOSARTAN POTASSIUM 25 MG/1
25 TABLET ORAL DAILY
Qty: 90 TABLET | Refills: 0 | Status: SHIPPED | OUTPATIENT
Start: 2025-04-29

## 2025-06-04 ENCOUNTER — OFFICE VISIT (OUTPATIENT)
Dept: INTERNAL MEDICINE CLINIC | Age: 65
End: 2025-06-04
Payer: COMMERCIAL

## 2025-06-04 VITALS
OXYGEN SATURATION: 97 % | SYSTOLIC BLOOD PRESSURE: 126 MMHG | HEIGHT: 71 IN | WEIGHT: 176.4 LBS | BODY MASS INDEX: 24.69 KG/M2 | DIASTOLIC BLOOD PRESSURE: 60 MMHG | HEART RATE: 77 BPM

## 2025-06-04 DIAGNOSIS — I10 PRIMARY HYPERTENSION: ICD-10-CM

## 2025-06-04 DIAGNOSIS — F17.200 TOBACCO USE DISORDER: Primary | ICD-10-CM

## 2025-06-04 DIAGNOSIS — Z00.00 ANNUAL PHYSICAL EXAM: ICD-10-CM

## 2025-06-04 PROCEDURE — 3078F DIAST BP <80 MM HG: CPT

## 2025-06-04 PROCEDURE — 99214 OFFICE O/P EST MOD 30 MIN: CPT

## 2025-06-04 PROCEDURE — 3074F SYST BP LT 130 MM HG: CPT

## 2025-06-04 RX ORDER — LOSARTAN POTASSIUM 25 MG/1
12.5 TABLET ORAL DAILY
Qty: 45 TABLET | Refills: 0 | Status: SHIPPED | OUTPATIENT
Start: 2025-06-04 | End: 2025-09-02

## 2025-06-04 SDOH — ECONOMIC STABILITY: FOOD INSECURITY: WITHIN THE PAST 12 MONTHS, YOU WORRIED THAT YOUR FOOD WOULD RUN OUT BEFORE YOU GOT MONEY TO BUY MORE.: NEVER TRUE

## 2025-06-04 SDOH — ECONOMIC STABILITY: FOOD INSECURITY: WITHIN THE PAST 12 MONTHS, THE FOOD YOU BOUGHT JUST DIDN'T LAST AND YOU DIDN'T HAVE MONEY TO GET MORE.: NEVER TRUE

## 2025-06-04 ASSESSMENT — PATIENT HEALTH QUESTIONNAIRE - PHQ9
SUM OF ALL RESPONSES TO PHQ QUESTIONS 1-9: 0
2. FEELING DOWN, DEPRESSED OR HOPELESS: NOT AT ALL
SUM OF ALL RESPONSES TO PHQ QUESTIONS 1-9: 0
SUM OF ALL RESPONSES TO PHQ QUESTIONS 1-9: 0
1. LITTLE INTEREST OR PLEASURE IN DOING THINGS: NOT AT ALL
SUM OF ALL RESPONSES TO PHQ QUESTIONS 1-9: 0

## 2025-06-04 NOTE — PROGRESS NOTES
Khai Palacios (:  1960) is a 65 y.o. male,Established patient, here for evaluation of the following chief complaint(s):  6 Month Follow-Up      History of Present Illness  The patient presents for evaluation of low blood pressure, cholesterol management, and smoking cessation.    He reports a general feeling of weakness and fatigue, which he attributes to his current antihypertensive medication regimen. Home blood pressure readings, taken with a wrist monitor, have been consistently low, with systolic values around 98 and diastolic values around 52. He experienced an episode of dizziness and lightheadedness upon standing yesterday. Despite these symptoms, he maintains adequate hydration. He is currently on losartan for blood pressure management but is uncertain about the dosage. He recalls a period of elevated blood pressure prior to his stent placement and questions whether his body may be accustomed to higher blood pressure levels.    He expresses concern about potential cognitive side effects from atorvastatin, including memory issues and difficulty with thought processes. He has previously discussed reducing the dosage of this medication with his cardiologist, who advised against it. His atorvastatin dosage was initially reduced to 40 mg by a previous physician but was later increased back to 80 mg by his cardiologist. He is uncertain about the current status of his cholesterol levels as he has not had comprehensive blood work done recently.    He continues to smoke cigarettes and has attempted to quit in the past, resulting in significant weight gain and persistent leg fatigue. He is considering another attempt at smoking cessation but prefers to do so without pharmacological assistance.    He has a history of a herniated disc in his neck, which has been asymptomatic for the past 6 to 7 years. However, he recently experienced a sharp, electric-like pain in the area.    SOCIAL HISTORY  The patient

## 2025-06-06 DIAGNOSIS — Z00.00 ANNUAL PHYSICAL EXAM: ICD-10-CM

## 2025-06-07 LAB
ALBUMIN SERPL-MCNC: 4 G/DL (ref 3.4–5)
ALP SERPL-CCNC: 95 U/L (ref 40–129)
ALT SERPL-CCNC: 24 U/L (ref 10–40)
ANION GAP SERPL CALCULATED.3IONS-SCNC: 10 MMOL/L (ref 3–16)
AST SERPL-CCNC: 23 U/L (ref 15–37)
BASOPHILS # BLD: 0.1 K/UL (ref 0–0.2)
BASOPHILS NFR BLD: 1.1 %
BILIRUB DIRECT SERPL-MCNC: 0.2 MG/DL (ref 0–0.3)
BILIRUB INDIRECT SERPL-MCNC: 0.2 MG/DL (ref 0–1)
BILIRUB SERPL-MCNC: 0.4 MG/DL (ref 0–1)
BUN SERPL-MCNC: 11 MG/DL (ref 7–20)
CALCIUM SERPL-MCNC: 9.1 MG/DL (ref 8.3–10.6)
CHLORIDE SERPL-SCNC: 105 MMOL/L (ref 99–110)
CHOLEST SERPL-MCNC: 128 MG/DL (ref 0–199)
CO2 SERPL-SCNC: 25 MMOL/L (ref 21–32)
CREAT SERPL-MCNC: 0.8 MG/DL (ref 0.8–1.3)
CREAT UR-MCNC: 61.9 MG/DL (ref 39–259)
DEPRECATED RDW RBC AUTO: 13.3 % (ref 12.4–15.4)
EOSINOPHIL # BLD: 0.4 K/UL (ref 0–0.6)
EOSINOPHIL NFR BLD: 3.9 %
EST. AVERAGE GLUCOSE BLD GHB EST-MCNC: 114 MG/DL
GFR SERPLBLD CREATININE-BSD FMLA CKD-EPI: >90 ML/MIN/{1.73_M2}
GLUCOSE SERPL-MCNC: 97 MG/DL (ref 70–99)
HBA1C MFR BLD: 5.6 %
HCT VFR BLD AUTO: 41.9 % (ref 40.5–52.5)
HDLC SERPL-MCNC: 41 MG/DL (ref 40–60)
HGB BLD-MCNC: 14.3 G/DL (ref 13.5–17.5)
LDLC SERPL CALC-MCNC: 73 MG/DL
LYMPHOCYTES # BLD: 2.7 K/UL (ref 1–5.1)
LYMPHOCYTES NFR BLD: 27.3 %
MCH RBC QN AUTO: 31.1 PG (ref 26–34)
MCHC RBC AUTO-ENTMCNC: 34.1 G/DL (ref 31–36)
MCV RBC AUTO: 91.4 FL (ref 80–100)
MICROALBUMIN UR DL<=1MG/L-MCNC: <1.2 MG/DL
MICROALBUMIN/CREAT UR: NORMAL MG/G (ref 0–30)
MONOCYTES # BLD: 0.5 K/UL (ref 0–1.3)
MONOCYTES NFR BLD: 5.1 %
NEUTROPHILS # BLD: 6.1 K/UL (ref 1.7–7.7)
NEUTROPHILS NFR BLD: 62.6 %
PLATELET # BLD AUTO: 239 K/UL (ref 135–450)
PMV BLD AUTO: 8.3 FL (ref 5–10.5)
POTASSIUM SERPL-SCNC: 4.2 MMOL/L (ref 3.5–5.1)
PROT SERPL-MCNC: 6.3 G/DL (ref 6.4–8.2)
RBC # BLD AUTO: 4.59 M/UL (ref 4.2–5.9)
SODIUM SERPL-SCNC: 140 MMOL/L (ref 136–145)
TRIGL SERPL-MCNC: 68 MG/DL (ref 0–150)
VLDLC SERPL CALC-MCNC: 14 MG/DL
WBC # BLD AUTO: 9.8 K/UL (ref 4–11)

## 2025-06-09 ENCOUNTER — RESULTS FOLLOW-UP (OUTPATIENT)
Dept: INTERNAL MEDICINE CLINIC | Age: 65
End: 2025-06-09

## 2025-07-02 ENCOUNTER — OFFICE VISIT (OUTPATIENT)
Dept: INTERNAL MEDICINE CLINIC | Age: 65
End: 2025-07-02
Payer: COMMERCIAL

## 2025-07-02 VITALS
TEMPERATURE: 97.8 F | DIASTOLIC BLOOD PRESSURE: 82 MMHG | BODY MASS INDEX: 24.53 KG/M2 | HEIGHT: 71 IN | HEART RATE: 74 BPM | SYSTOLIC BLOOD PRESSURE: 128 MMHG | WEIGHT: 175.2 LBS | OXYGEN SATURATION: 94 %

## 2025-07-02 DIAGNOSIS — Z12.2 SCREENING FOR LUNG CANCER: Primary | ICD-10-CM

## 2025-07-02 DIAGNOSIS — Z87.891 PERSONAL HISTORY OF TOBACCO USE: ICD-10-CM

## 2025-07-02 PROCEDURE — G0296 VISIT TO DETERM LDCT ELIG: HCPCS

## 2025-07-02 PROCEDURE — 99214 OFFICE O/P EST MOD 30 MIN: CPT

## 2025-07-02 NOTE — PROGRESS NOTES
baseline.   Psychiatric:         Mood and Affect: Mood normal.         Behavior: Behavior normal.              This dictation was generated by voice recognition computer software.  Although all attempts are made to edit the dictation for accuracy, there may be errors in the transcription that are not intended.    An electronic signature was used to authenticate this note.    --Junaid Sanford, DO

## 2025-07-18 ENCOUNTER — HOSPITAL ENCOUNTER (OUTPATIENT)
Dept: CT IMAGING | Age: 65
Discharge: HOME | End: 2025-07-18
Payer: COMMERCIAL

## 2025-07-18 DIAGNOSIS — Z87.891 PERSONAL HISTORY OF TOBACCO USE: ICD-10-CM

## 2025-07-18 PROCEDURE — 71271 CT THORAX LUNG CANCER SCR C-: CPT
